# Patient Record
Sex: FEMALE | Race: WHITE | NOT HISPANIC OR LATINO | Employment: OTHER | URBAN - METROPOLITAN AREA
[De-identification: names, ages, dates, MRNs, and addresses within clinical notes are randomized per-mention and may not be internally consistent; named-entity substitution may affect disease eponyms.]

---

## 2023-10-16 ENCOUNTER — HOSPITAL ENCOUNTER (INPATIENT)
Facility: HOSPITAL | Age: 78
LOS: 2 days | Discharge: HOME | DRG: 322 | End: 2023-10-18
Attending: INTERNAL MEDICINE | Admitting: INTERNAL MEDICINE
Payer: MEDICARE

## 2023-10-16 DIAGNOSIS — I20.0 UNSTABLE ANGINA (MULTI): Primary | ICD-10-CM

## 2023-10-16 DIAGNOSIS — R93.1 ABNORMAL FINDINGS ON DIAGNOSTIC IMAGING OF HEART AND CORONARY CIRCULATION: ICD-10-CM

## 2023-10-16 DIAGNOSIS — I25.112 ATHEROSCLEROTIC HEART DISEASE OF NATIVE CORONARY ARTERY WITH REFRACTORY ANGINA PECTORIS (CMS-HCC): ICD-10-CM

## 2023-10-16 LAB
ANION GAP SERPL CALC-SCNC: 11 MMOL/L (ref 10–20)
APTT PPP: 28 SECONDS (ref 27–38)
BNP SERPL-MCNC: 344 PG/ML (ref 0–99)
BUN SERPL-MCNC: 21 MG/DL (ref 6–23)
CALCIUM SERPL-MCNC: 9 MG/DL (ref 8.6–10.3)
CARDIAC TROPONIN I PNL SERPL HS: 16 NG/L (ref 0–13)
CHLORIDE SERPL-SCNC: 103 MMOL/L (ref 98–107)
CO2 SERPL-SCNC: 31 MMOL/L (ref 21–32)
CREAT SERPL-MCNC: 0.52 MG/DL (ref 0.5–1.05)
ERYTHROCYTE [DISTWIDTH] IN BLOOD BY AUTOMATED COUNT: 12.3 % (ref 11.5–14.5)
GFR SERPL CREATININE-BSD FRML MDRD: >90 ML/MIN/1.73M*2
GLUCOSE SERPL-MCNC: 114 MG/DL (ref 74–99)
HCT VFR BLD AUTO: 44.4 % (ref 36–46)
HGB BLD-MCNC: 14.7 G/DL (ref 12–16)
INR PPP: 1.1 (ref 0.9–1.1)
MCH RBC QN AUTO: 30.5 PG (ref 26–34)
MCHC RBC AUTO-ENTMCNC: 33.1 G/DL (ref 32–36)
MCV RBC AUTO: 92 FL (ref 80–100)
NRBC BLD-RTO: 0 /100 WBCS (ref 0–0)
PLATELET # BLD AUTO: 225 X10*3/UL (ref 150–450)
PMV BLD AUTO: 10.1 FL (ref 7.5–11.5)
POTASSIUM SERPL-SCNC: 3.5 MMOL/L (ref 3.5–5.3)
PROTHROMBIN TIME: 12.4 SECONDS (ref 9.8–12.8)
RBC # BLD AUTO: 4.82 X10*6/UL (ref 4–5.2)
SODIUM SERPL-SCNC: 141 MMOL/L (ref 136–145)
WBC # BLD AUTO: 8.3 X10*3/UL (ref 4.4–11.3)

## 2023-10-16 PROCEDURE — 96372 THER/PROPH/DIAG INJ SC/IM: CPT | Performed by: INTERNAL MEDICINE

## 2023-10-16 PROCEDURE — 84484 ASSAY OF TROPONIN QUANT: CPT | Performed by: INTERNAL MEDICINE

## 2023-10-16 PROCEDURE — 36415 COLL VENOUS BLD VENIPUNCTURE: CPT | Performed by: INTERNAL MEDICINE

## 2023-10-16 PROCEDURE — 93005 ELECTROCARDIOGRAM TRACING: CPT

## 2023-10-16 PROCEDURE — B2111ZZ FLUOROSCOPY OF MULTIPLE CORONARY ARTERIES USING LOW OSMOLAR CONTRAST: ICD-10-PCS | Performed by: SPECIALIST

## 2023-10-16 PROCEDURE — 2500000001 HC RX 250 WO HCPCS SELF ADMINISTERED DRUGS (ALT 637 FOR MEDICARE OP): Performed by: INTERNAL MEDICINE

## 2023-10-16 PROCEDURE — 82374 ASSAY BLOOD CARBON DIOXIDE: CPT | Performed by: INTERNAL MEDICINE

## 2023-10-16 PROCEDURE — 4A023N7 MEASUREMENT OF CARDIAC SAMPLING AND PRESSURE, LEFT HEART, PERCUTANEOUS APPROACH: ICD-10-PCS | Performed by: SPECIALIST

## 2023-10-16 PROCEDURE — 1100000001 HC PRIVATE ROOM DAILY

## 2023-10-16 PROCEDURE — 85027 COMPLETE CBC AUTOMATED: CPT | Performed by: INTERNAL MEDICINE

## 2023-10-16 PROCEDURE — 85610 PROTHROMBIN TIME: CPT | Performed by: INTERNAL MEDICINE

## 2023-10-16 PROCEDURE — 2500000004 HC RX 250 GENERAL PHARMACY W/ HCPCS (ALT 636 FOR OP/ED): Performed by: INTERNAL MEDICINE

## 2023-10-16 PROCEDURE — 027034Z DILATION OF CORONARY ARTERY, ONE ARTERY WITH DRUG-ELUTING INTRALUMINAL DEVICE, PERCUTANEOUS APPROACH: ICD-10-PCS | Performed by: SPECIALIST

## 2023-10-16 PROCEDURE — 83880 ASSAY OF NATRIURETIC PEPTIDE: CPT | Performed by: INTERNAL MEDICINE

## 2023-10-16 RX ORDER — ROSUVASTATIN CALCIUM 20 MG/1
20 TABLET, COATED ORAL DAILY
COMMUNITY

## 2023-10-16 RX ORDER — ACETAMINOPHEN 325 MG/1
650 TABLET ORAL EVERY 4 HOURS PRN
Status: DISCONTINUED | OUTPATIENT
Start: 2023-10-16 | End: 2023-10-18 | Stop reason: HOSPADM

## 2023-10-16 RX ORDER — ROSUVASTATIN CALCIUM 20 MG/1
20 TABLET, COATED ORAL DAILY
Status: DISCONTINUED | OUTPATIENT
Start: 2023-10-17 | End: 2023-10-16

## 2023-10-16 RX ORDER — NITROGLYCERIN 0.4 MG/1
0.4 TABLET SUBLINGUAL EVERY 5 MIN PRN
Status: DISCONTINUED | OUTPATIENT
Start: 2023-10-16 | End: 2023-10-18 | Stop reason: HOSPADM

## 2023-10-16 RX ORDER — METOPROLOL SUCCINATE 100 MG/1
150 TABLET, EXTENDED RELEASE ORAL DAILY
COMMUNITY

## 2023-10-16 RX ORDER — ENOXAPARIN SODIUM 100 MG/ML
40 INJECTION SUBCUTANEOUS EVERY 24 HOURS
Status: DISCONTINUED | OUTPATIENT
Start: 2023-10-16 | End: 2023-10-17

## 2023-10-16 RX ORDER — TALC
3 POWDER (GRAM) TOPICAL NIGHTLY PRN
Status: DISCONTINUED | OUTPATIENT
Start: 2023-10-16 | End: 2023-10-18 | Stop reason: HOSPADM

## 2023-10-16 RX ORDER — ASPIRIN 81 MG/1
81 TABLET ORAL DAILY
COMMUNITY

## 2023-10-16 RX ORDER — ROSUVASTATIN CALCIUM 20 MG/1
20 TABLET, COATED ORAL NIGHTLY
Status: DISCONTINUED | OUTPATIENT
Start: 2023-10-16 | End: 2023-10-17 | Stop reason: SDUPTHER

## 2023-10-16 RX ORDER — AMITRIPTYLINE HYDROCHLORIDE 10 MG/1
10 TABLET, FILM COATED ORAL NIGHTLY
COMMUNITY

## 2023-10-16 RX ORDER — POLYETHYLENE GLYCOL 3350 17 G/17G
17 POWDER, FOR SOLUTION ORAL DAILY PRN
Status: DISCONTINUED | OUTPATIENT
Start: 2023-10-16 | End: 2023-10-18 | Stop reason: HOSPADM

## 2023-10-16 RX ORDER — ASPIRIN 81 MG/1
81 TABLET ORAL DAILY
Status: DISCONTINUED | OUTPATIENT
Start: 2023-10-17 | End: 2023-10-18 | Stop reason: HOSPADM

## 2023-10-16 RX ORDER — ACETAMINOPHEN 160 MG/5ML
650 SOLUTION ORAL EVERY 4 HOURS PRN
Status: DISCONTINUED | OUTPATIENT
Start: 2023-10-16 | End: 2023-10-18 | Stop reason: HOSPADM

## 2023-10-16 RX ORDER — LEVOTHYROXINE SODIUM 25 UG/1
25 TABLET ORAL
COMMUNITY

## 2023-10-16 RX ORDER — LISINOPRIL 20 MG/1
20 TABLET ORAL DAILY
COMMUNITY

## 2023-10-16 RX ORDER — ACETAMINOPHEN 650 MG/1
650 SUPPOSITORY RECTAL EVERY 4 HOURS PRN
Status: DISCONTINUED | OUTPATIENT
Start: 2023-10-16 | End: 2023-10-18 | Stop reason: HOSPADM

## 2023-10-16 RX ORDER — LISINOPRIL 20 MG/1
20 TABLET ORAL DAILY
Status: DISCONTINUED | OUTPATIENT
Start: 2023-10-17 | End: 2023-10-18 | Stop reason: HOSPADM

## 2023-10-16 RX ORDER — LEVOTHYROXINE SODIUM 25 UG/1
25 TABLET ORAL
Status: DISCONTINUED | OUTPATIENT
Start: 2023-10-17 | End: 2023-10-18 | Stop reason: HOSPADM

## 2023-10-16 RX ORDER — HYDROCHLOROTHIAZIDE 25 MG/1
12.5 TABLET ORAL DAILY
COMMUNITY

## 2023-10-16 RX ORDER — NITROGLYCERIN 0.4 MG/1
0.4 TABLET SUBLINGUAL EVERY 5 MIN PRN
COMMUNITY

## 2023-10-16 RX ORDER — AMITRIPTYLINE HYDROCHLORIDE 10 MG/1
10 TABLET, FILM COATED ORAL NIGHTLY
Status: DISCONTINUED | OUTPATIENT
Start: 2023-10-16 | End: 2023-10-18 | Stop reason: HOSPADM

## 2023-10-16 RX ORDER — HYDROCHLOROTHIAZIDE 25 MG/1
12.5 TABLET ORAL DAILY
Status: DISCONTINUED | OUTPATIENT
Start: 2023-10-17 | End: 2023-10-18 | Stop reason: HOSPADM

## 2023-10-16 RX ADMIN — AMITRIPTYLINE HYDROCHLORIDE 10 MG: 10 TABLET, FILM COATED ORAL at 21:47

## 2023-10-16 RX ADMIN — ENOXAPARIN SODIUM 40 MG: 40 INJECTION SUBCUTANEOUS at 21:47

## 2023-10-16 RX ADMIN — Medication 3 MG: at 21:47

## 2023-10-16 SDOH — SOCIAL STABILITY: SOCIAL INSECURITY: WERE YOU ABLE TO COMPLETE ALL THE BEHAVIORAL HEALTH SCREENINGS?: YES

## 2023-10-16 SDOH — SOCIAL STABILITY: SOCIAL INSECURITY: HAS ANYONE EVER THREATENED TO HURT YOUR FAMILY OR YOUR PETS?: NO

## 2023-10-16 SDOH — SOCIAL STABILITY: SOCIAL INSECURITY: ARE THERE ANY APPARENT SIGNS OF INJURIES/BEHAVIORS THAT COULD BE RELATED TO ABUSE/NEGLECT?: NO

## 2023-10-16 SDOH — SOCIAL STABILITY: SOCIAL INSECURITY: DO YOU FEEL UNSAFE GOING BACK TO THE PLACE WHERE YOU ARE LIVING?: NO

## 2023-10-16 SDOH — SOCIAL STABILITY: SOCIAL INSECURITY: HAVE YOU HAD THOUGHTS OF HARMING ANYONE ELSE?: YES

## 2023-10-16 SDOH — SOCIAL STABILITY: SOCIAL INSECURITY: ARE YOU OR HAVE YOU BEEN THREATENED OR ABUSED PHYSICALLY, EMOTIONALLY, OR SEXUALLY BY ANYONE?: NO

## 2023-10-16 SDOH — SOCIAL STABILITY: SOCIAL INSECURITY: DO YOU FEEL ANYONE HAS EXPLOITED OR TAKEN ADVANTAGE OF YOU FINANCIALLY OR OF YOUR PERSONAL PROPERTY?: NO

## 2023-10-16 SDOH — SOCIAL STABILITY: SOCIAL INSECURITY: ABUSE: ADULT

## 2023-10-16 SDOH — SOCIAL STABILITY: SOCIAL INSECURITY: DOES ANYONE TRY TO KEEP YOU FROM HAVING/CONTACTING OTHER FRIENDS OR DOING THINGS OUTSIDE YOUR HOME?: NO

## 2023-10-16 ASSESSMENT — COGNITIVE AND FUNCTIONAL STATUS - GENERAL
DAILY ACTIVITIY SCORE: 24
MOBILITY SCORE: 24
PATIENT BASELINE BEDBOUND: NO

## 2023-10-16 ASSESSMENT — ACTIVITIES OF DAILY LIVING (ADL)
HEARING - LEFT EAR: FUNCTIONAL
HEARING - RIGHT EAR: FUNCTIONAL
LACK_OF_TRANSPORTATION: NO
TOILETING: INDEPENDENT
JUDGMENT_ADEQUATE_SAFELY_COMPLETE_DAILY_ACTIVITIES: YES
FEEDING YOURSELF: INDEPENDENT
DRESSING YOURSELF: INDEPENDENT
GROOMING: INDEPENDENT
PATIENT'S MEMORY ADEQUATE TO SAFELY COMPLETE DAILY ACTIVITIES?: YES
BATHING: INDEPENDENT
WALKS IN HOME: INDEPENDENT
ADEQUATE_TO_COMPLETE_ADL: YES

## 2023-10-16 ASSESSMENT — PAIN SCALES - GENERAL
PAINLEVEL_OUTOF10: 0 - NO PAIN
PAINLEVEL_OUTOF10: 0 - NO PAIN

## 2023-10-16 ASSESSMENT — LIFESTYLE VARIABLES
HOW OFTEN DO YOU HAVE A DRINK CONTAINING ALCOHOL: 2-3 TIMES A WEEK
AUDIT-C TOTAL SCORE: 3
SKIP TO QUESTIONS 9-10: 1
HOW MANY STANDARD DRINKS CONTAINING ALCOHOL DO YOU HAVE ON A TYPICAL DAY: 1 OR 2
AUDIT-C TOTAL SCORE: 3
HOW OFTEN DO YOU HAVE 6 OR MORE DRINKS ON ONE OCCASION: NEVER

## 2023-10-16 ASSESSMENT — PAIN - FUNCTIONAL ASSESSMENT
PAIN_FUNCTIONAL_ASSESSMENT: 0-10
PAIN_FUNCTIONAL_ASSESSMENT: 0-10

## 2023-10-16 ASSESSMENT — COLUMBIA-SUICIDE SEVERITY RATING SCALE - C-SSRS
2. HAVE YOU ACTUALLY HAD ANY THOUGHTS OF KILLING YOURSELF?: NO
6. HAVE YOU EVER DONE ANYTHING, STARTED TO DO ANYTHING, OR PREPARED TO DO ANYTHING TO END YOUR LIFE?: NO
1. IN THE PAST MONTH, HAVE YOU WISHED YOU WERE DEAD OR WISHED YOU COULD GO TO SLEEP AND NOT WAKE UP?: NO

## 2023-10-16 ASSESSMENT — PATIENT HEALTH QUESTIONNAIRE - PHQ9
2. FEELING DOWN, DEPRESSED OR HOPELESS: NOT AT ALL
1. LITTLE INTEREST OR PLEASURE IN DOING THINGS: NOT AT ALL
SUM OF ALL RESPONSES TO PHQ9 QUESTIONS 1 & 2: 0

## 2023-10-16 NOTE — Clinical Note
Vessel: LAD (proximal). Stent inserted. Inflation 1: Pressure = 12 brandy; Duration = 35 sec. Stent deployed

## 2023-10-16 NOTE — Clinical Note
Angioplasty of the proximal left anterior descending lesion. Inflation 1: Pressure = 8 brandy; Duration = 8 sec. Inflation 2: Pressure = 8 brandy; Duration = 16 sec.

## 2023-10-16 NOTE — Clinical Note
Angioplasty of the left anterior descending lesion. Inflation 1: Pressure = 12 brandy; Duration = 20 sec. Inflation 2: Pressure = 16 brandy; Duration = 20 sec. Inflation 3: Pressure = 19 brandy; Duration = 30 sec.

## 2023-10-17 ENCOUNTER — APPOINTMENT (OUTPATIENT)
Dept: CARDIOLOGY | Facility: HOSPITAL | Age: 78
DRG: 322 | End: 2023-10-17
Payer: MEDICARE

## 2023-10-17 LAB
CARDIAC TROPONIN I PNL SERPL HS: 27 NG/L (ref 0–13)
CHOLEST SERPL-MCNC: 110 MG/DL (ref 0–199)
CHOLESTEROL/HDL RATIO: 2.3
EJECTION FRACTION APICAL 4 CHAMBER: 50
HDLC SERPL-MCNC: 48.4 MG/DL
LDLC SERPL CALC-MCNC: 49 MG/DL
MAGNESIUM SERPL-MCNC: 1.92 MG/DL (ref 1.6–2.4)
NON HDL CHOLESTEROL: 62 MG/DL (ref 0–149)
TRIGL SERPL-MCNC: 63 MG/DL (ref 0–149)
TSH SERPL-ACNC: 1.72 MIU/L (ref 0.44–3.98)
VLDL: 13 MG/DL (ref 0–40)

## 2023-10-17 PROCEDURE — 36415 COLL VENOUS BLD VENIPUNCTURE: CPT | Performed by: NURSE PRACTITIONER

## 2023-10-17 PROCEDURE — 2500000005 HC RX 250 GENERAL PHARMACY W/O HCPCS: Performed by: SPECIALIST

## 2023-10-17 PROCEDURE — 85347 COAGULATION TIME ACTIVATED: CPT | Performed by: SPECIALIST

## 2023-10-17 PROCEDURE — C1769 GUIDE WIRE: HCPCS | Performed by: SPECIALIST

## 2023-10-17 PROCEDURE — 96373 THER/PROPH/DIAG INJ IA: CPT | Performed by: SPECIALIST

## 2023-10-17 PROCEDURE — C1894 INTRO/SHEATH, NON-LASER: HCPCS | Performed by: SPECIALIST

## 2023-10-17 PROCEDURE — 93005 ELECTROCARDIOGRAM TRACING: CPT

## 2023-10-17 PROCEDURE — 99152 MOD SED SAME PHYS/QHP 5/>YRS: CPT | Performed by: SPECIALIST

## 2023-10-17 PROCEDURE — 83735 ASSAY OF MAGNESIUM: CPT | Performed by: NURSE PRACTITIONER

## 2023-10-17 PROCEDURE — C9600 PERC DRUG-EL COR STENT SING: HCPCS | Performed by: SPECIALIST

## 2023-10-17 PROCEDURE — 2780000003 HC OR 278 NO HCPCS: Performed by: SPECIALIST

## 2023-10-17 PROCEDURE — 2500000004 HC RX 250 GENERAL PHARMACY W/ HCPCS (ALT 636 FOR OP/ED): Performed by: NURSE PRACTITIONER

## 2023-10-17 PROCEDURE — 80061 LIPID PANEL: CPT | Performed by: INTERNAL MEDICINE

## 2023-10-17 PROCEDURE — 2500000004 HC RX 250 GENERAL PHARMACY W/ HCPCS (ALT 636 FOR OP/ED): Performed by: SPECIALIST

## 2023-10-17 PROCEDURE — 36415 COLL VENOUS BLD VENIPUNCTURE: CPT | Performed by: INTERNAL MEDICINE

## 2023-10-17 PROCEDURE — 93458 L HRT ARTERY/VENTRICLE ANGIO: CPT | Performed by: SPECIALIST

## 2023-10-17 PROCEDURE — 2500000001 HC RX 250 WO HCPCS SELF ADMINISTERED DRUGS (ALT 637 FOR MEDICARE OP): Performed by: SPECIALIST

## 2023-10-17 PROCEDURE — 2500000004 HC RX 250 GENERAL PHARMACY W/ HCPCS (ALT 636 FOR OP/ED): Performed by: INTERNAL MEDICINE

## 2023-10-17 PROCEDURE — 2500000001 HC RX 250 WO HCPCS SELF ADMINISTERED DRUGS (ALT 637 FOR MEDICARE OP): Performed by: INTERNAL MEDICINE

## 2023-10-17 PROCEDURE — C1874 STENT, COATED/COV W/DEL SYS: HCPCS | Performed by: SPECIALIST

## 2023-10-17 PROCEDURE — 84484 ASSAY OF TROPONIN QUANT: CPT | Performed by: INTERNAL MEDICINE

## 2023-10-17 PROCEDURE — 99153 MOD SED SAME PHYS/QHP EA: CPT | Performed by: SPECIALIST

## 2023-10-17 PROCEDURE — 2720000007 HC OR 272 NO HCPCS: Performed by: SPECIALIST

## 2023-10-17 PROCEDURE — C1887 CATHETER, GUIDING: HCPCS | Performed by: SPECIALIST

## 2023-10-17 PROCEDURE — 84443 ASSAY THYROID STIM HORMONE: CPT | Performed by: INTERNAL MEDICINE

## 2023-10-17 PROCEDURE — 2060000001 HC INTERMEDIATE ICU ROOM DAILY

## 2023-10-17 PROCEDURE — C1725 CATH, TRANSLUMIN NON-LASER: HCPCS | Performed by: SPECIALIST

## 2023-10-17 PROCEDURE — 93306 TTE W/DOPPLER COMPLETE: CPT

## 2023-10-17 DEVICE — STENT ONYXNG30034UX ONYX 3.00X34RX
Type: IMPLANTABLE DEVICE | Status: FUNCTIONAL
Brand: ONYX FRONTIER™

## 2023-10-17 RX ORDER — HYDRALAZINE HYDROCHLORIDE 20 MG/ML
5 INJECTION INTRAMUSCULAR; INTRAVENOUS EVERY 6 HOURS PRN
Status: DISCONTINUED | OUTPATIENT
Start: 2023-10-17 | End: 2023-10-18 | Stop reason: HOSPADM

## 2023-10-17 RX ORDER — ATORVASTATIN CALCIUM 80 MG/1
80 TABLET, FILM COATED ORAL NIGHTLY
Status: DISCONTINUED | OUTPATIENT
Start: 2023-10-17 | End: 2023-10-18 | Stop reason: HOSPADM

## 2023-10-17 RX ORDER — VERAPAMIL HYDROCHLORIDE 2.5 MG/ML
INJECTION, SOLUTION INTRAVENOUS AS NEEDED
Status: DISCONTINUED | OUTPATIENT
Start: 2023-10-17 | End: 2023-10-17 | Stop reason: HOSPADM

## 2023-10-17 RX ORDER — SODIUM CHLORIDE 9 MG/ML
INJECTION, SOLUTION INTRAVENOUS CONTINUOUS PRN
Status: DISCONTINUED | OUTPATIENT
Start: 2023-10-17 | End: 2023-10-17 | Stop reason: HOSPADM

## 2023-10-17 RX ORDER — FENTANYL CITRATE 50 UG/ML
INJECTION, SOLUTION INTRAMUSCULAR; INTRAVENOUS AS NEEDED
Status: DISCONTINUED | OUTPATIENT
Start: 2023-10-17 | End: 2023-10-17 | Stop reason: HOSPADM

## 2023-10-17 RX ORDER — LIDOCAINE HYDROCHLORIDE 20 MG/ML
INJECTION, SOLUTION INFILTRATION; PERINEURAL AS NEEDED
Status: DISCONTINUED | OUTPATIENT
Start: 2023-10-17 | End: 2023-10-17 | Stop reason: HOSPADM

## 2023-10-17 RX ORDER — MIDAZOLAM HYDROCHLORIDE 1 MG/ML
INJECTION INTRAMUSCULAR; INTRAVENOUS AS NEEDED
Status: DISCONTINUED | OUTPATIENT
Start: 2023-10-17 | End: 2023-10-17 | Stop reason: HOSPADM

## 2023-10-17 RX ORDER — NITROGLYCERIN 5 MG/ML
INJECTION, SOLUTION INTRAVENOUS AS NEEDED
Status: DISCONTINUED | OUTPATIENT
Start: 2023-10-17 | End: 2023-10-17 | Stop reason: HOSPADM

## 2023-10-17 RX ORDER — HEPARIN SODIUM 1000 [USP'U]/ML
INJECTION, SOLUTION INTRAVENOUS; SUBCUTANEOUS AS NEEDED
Status: DISCONTINUED | OUTPATIENT
Start: 2023-10-17 | End: 2023-10-17 | Stop reason: HOSPADM

## 2023-10-17 RX ORDER — SODIUM CHLORIDE 9 MG/ML
3 INJECTION, SOLUTION INTRAVENOUS CONTINUOUS
Status: ACTIVE | OUTPATIENT
Start: 2023-10-17 | End: 2023-10-17

## 2023-10-17 RX ORDER — LANOLIN ALCOHOL/MO/W.PET/CERES
400 CREAM (GRAM) TOPICAL ONCE
Status: COMPLETED | OUTPATIENT
Start: 2023-10-17 | End: 2023-10-17

## 2023-10-17 RX ORDER — POTASSIUM CHLORIDE 20 MEQ/1
40 TABLET, EXTENDED RELEASE ORAL ONCE
Status: COMPLETED | OUTPATIENT
Start: 2023-10-17 | End: 2023-10-17

## 2023-10-17 RX ADMIN — HYDRALAZINE HYDROCHLORIDE 5 MG: 20 INJECTION INTRAMUSCULAR; INTRAVENOUS at 10:14

## 2023-10-17 RX ADMIN — Medication 400 MG: at 10:14

## 2023-10-17 RX ADMIN — ATORVASTATIN CALCIUM 80 MG: 80 TABLET, FILM COATED ORAL at 21:38

## 2023-10-17 RX ADMIN — LISINOPRIL 20 MG: 20 TABLET ORAL at 08:11

## 2023-10-17 RX ADMIN — SODIUM CHLORIDE 3 ML/KG/HR: 9 INJECTION, SOLUTION INTRAVENOUS at 17:45

## 2023-10-17 RX ADMIN — POTASSIUM CHLORIDE 40 MEQ: 1500 TABLET, EXTENDED RELEASE ORAL at 09:34

## 2023-10-17 RX ADMIN — NITROGLYCERIN 0.4 MG: 0.4 TABLET SUBLINGUAL at 01:24

## 2023-10-17 RX ADMIN — TICAGRELOR 90 MG: 90 TABLET ORAL at 21:38

## 2023-10-17 RX ADMIN — ASPIRIN 81 MG: 81 TABLET, COATED ORAL at 08:11

## 2023-10-17 RX ADMIN — Medication 3 MG: at 22:28

## 2023-10-17 RX ADMIN — LEVOTHYROXINE SODIUM 25 MCG: 25 TABLET ORAL at 06:14

## 2023-10-17 RX ADMIN — AMITRIPTYLINE HYDROCHLORIDE 10 MG: 10 TABLET, FILM COATED ORAL at 21:38

## 2023-10-17 RX ADMIN — HYDROCHLOROTHIAZIDE 12.5 MG: 25 TABLET ORAL at 08:11

## 2023-10-17 RX ADMIN — METOPROLOL SUCCINATE 150 MG: 100 TABLET, EXTENDED RELEASE ORAL at 08:11

## 2023-10-17 ASSESSMENT — PAIN SCALES - GENERAL
PAINLEVEL_OUTOF10: 0 - NO PAIN
PAINLEVEL_OUTOF10: 4
PAINLEVEL_OUTOF10: 0 - NO PAIN

## 2023-10-17 NOTE — H&P
DELTA NEWSOME MD  Cardiovascular Disease  [facility]  [address] [facility]   [address], [address]  [place] [address]  ____________________________________________________________________________________    HISTORY AND PHYSICAL     SERVICE DATE: 10/16/2023   SERVICE TIME:  8:42 PM    PRIMARY CARE PHYSICIAN:  Marry Mancini MD    Subjective   CHIEF COMPLAINT:   Chest pain    HISTORY OF PRESENT ILLNESS:  Chelsea Stinson is a 78 y.o. female with a PMH significant for CAD s/p PCI in 2013, HTN, HLD, hypothyroidism, nephrolithiasis, anxiety who presents for direct admission for management of unstable angina. She has had progressively more frequent chest pain, initially at times with exertion, now with only mild exertion, starting over a month ago. Pain is now present with any stress and with preparing her  for bed, which includes changing his indwelling catheter. She does not help to maneuver him. She does have some shortness of breath with the chest pain. She may have some palpitations when anxious. She denies lightheadedness, dizziness, leg swelling, orthopnea, PND.     Patient recently underwent stress SPECT on 9/18 to evaluate her symptoms which showed ischemia in the LAD territory.     Past Medical History:   Diagnosis Date    Other conditions influencing health status     Coronary Artery Disease     Past Surgical History:   Procedure Laterality Date    CORONARY ANGIOPLASTY WITH STENT PLACEMENT  08/22/2013    Cath Stent Placement    LITHOTRIPSY  07/27/2015    Renal Lithotripsy    TUBAL LIGATION  07/27/2015    Tubal Ligation     No family history on file.    Social History     Socioeconomic History    Marital status:      Spouse name: Not on file    Number of children: Not on file    Years of education: Not on file    Highest education level: Not on file   Occupational History    Not on file   Tobacco Use    Smoking status: Not on file    Smokeless tobacco: Not on file    Substance and Sexual Activity    Alcohol use: Not on file    Drug use: Not on file    Sexual activity: Not on file   Other Topics Concern    Not on file   Social History Narrative    Not on file     Social Determinants of Health     Financial Resource Strain: Low Risk  (10/16/2023)    Overall Financial Resource Strain (CARDIA)     Difficulty of Paying Living Expenses: Not hard at all   Food Insecurity: Not on file   Transportation Needs: No Transportation Needs (10/16/2023)    PRAPARE - Transportation     Lack of Transportation (Medical): No     Lack of Transportation (Non-Medical): No   Physical Activity: Not on file   Stress: Not on file   Social Connections: Not on file   Intimate Partner Violence: Not on file   Housing Stability: Low Risk  (10/16/2023)    Housing Stability Vital Sign     Unable to Pay for Housing in the Last Year: No     Number of Places Lived in the Last Year: 1     Unstable Housing in the Last Year: No         MEDICATIONS:  Medications Prior to Admission   Medication Sig Dispense Refill Last Dose    amitriptyline (Elavil) 10 mg tablet Take 1 tablet (10 mg) by mouth once daily at bedtime.   10/15/2023    aspirin 81 mg EC tablet Take 1 tablet (81 mg) by mouth once daily.   10/16/2023    hydroCHLOROthiazide (HYDRODiuril) 25 mg tablet Take 0.5 tablets (12.5 mg) by mouth once daily.   10/16/2023    levothyroxine (Synthroid, Levoxyl) 25 mcg tablet Take 1 tablet (25 mcg) by mouth once daily in the morning. Take before meals.   10/16/2023    lisinopril 20 mg tablet Take 1 tablet (20 mg) by mouth once daily.   10/16/2023    metoprolol succinate XL (Toprol-XL) 100 mg 24 hr tablet Take 1.5 tablets (150 mg) by mouth once daily. Do not crush or chew.   10/16/2023    nitroglycerin (Nitrostat) 0.4 mg SL tablet Place 1 tablet (0.4 mg) under the tongue every 5 minutes if needed for chest pain.   Past Month    rosuvastatin (Crestor) 20 mg tablet Take 1 tablet (20 mg) by mouth once daily.   10/16/2023     No  "Known Allergies    COMPLETE REVIEW OF SYSTEMS:  All systems reviewed and are negative except for as stated in the HPI.       Objective   PHYSICAL EXAM:  Patient Vitals for the past 24 hrs:   BP Temp Temp src Pulse Resp SpO2 Height Weight   10/16/23 1945 159/85 36.1 °C (97 °F) Temporal 60 18 100 % -- --   10/16/23 1826 172/86 -- -- 67 20 -- -- --   10/16/23 1824 (!) 200/86 35.6 °C (96.1 °F) Temporal 72 20 98 % 1.676 m (5' 6\") 62.3 kg (137 lb 5.6 oz)     Body mass index is 22.17 kg/m².  General appearance: Well appearing, alert, in no acute distress  Skin: Skin color, texture, turgor normal  Head: Normocephalic, no masses, lesions  Eyes: Anicteric sclera, conjunctiva clear, EOMI  Oropharynx: Moist mucus membranes, good dentition  Neck: Supple, no adenopathy; thyroid symmetric  Lungs: Patient airways, CTAB, no wheezes, rhonchi, or rales   Heart: Regular rate and rhythm; no murmur, gallop, or rub; normal S1 and S2, no JVD  Abdomen: Soft, nontender, nondistended, no obvious HSM  Extremities: No edema, no cyanosis, no clubbing  Vascular: Pedal pulses 2+ b/l, no carotid bruits  Psych: A&Ox3, appropriate mood and affect     DATA:   Diagnostic tests reviewed for today's visit:      STRESS SPECT 9/18/2023  CONCLUSION:   1. This is an intermediate risk study.   2. There is no electrocardiographic evidence for stress-induced ischemia.   3. There is a small to medium-sized, moderate severity, mid to distal anterior, anteroseptal, and apical reversible perfusion defect suggestive of ischemia. There is transient ischemic dilation seen.   4. Normal left ventricular systolic function with calculated ejection fraction of 67% by gated SPECT with no regional wall motion abnormalities appreciated.          ASSESSMENT:     Unstable angina, progressive symptoms now with mild exertion. Stress SPECT 9/2023 showed anterior and apical ischemia, TID 1.27.   CAD s/p PCI to LCX/OM in 2013  Essential hypertension, elevated today in the setting of " anxiety   Dyslipidemia  Hypothyroidism  Anxiety      PLAN:     - NPO after midnight for LHC +/- PCI with Dr. Sprague in AM   - ASA 81mg daily, high intensity statin   - Continue metoprolol, recently increased to 150mg daily  - Continue lisinopril, hydrochlorothiazide  - SL NTG PRN for chest pain  - Continuous telemetry monitoring   - Transthoracic echocardiogram  - ECG  - CBC, BMP, proBNP, hsTNT  - Check lipid panel, TSH in AM  - Continue home Elavil at bedtime, PRN melatonin   - Continue levothyroxine            I spent 45 minutes in the visit, with more than 50% of the total face-to-face time of the visit in counseling / coordination of care.    DELTA NEWSOME MD, [facility]  Cardiovascular Medicine  [facility]  Phone/Answering Service: [phone]  Fax: [phone]

## 2023-10-17 NOTE — NURSING NOTE
Patient is alert x4. Went for cath lab this evening around 4pm. Returned with TR band R radial with 16ml of air. No bruising and no hematoma at site. Denies chest pain, denies SOB. NSR on tele. Continues with plan of care.

## 2023-10-17 NOTE — PROGRESS NOTES
DELTA NEWSOME MD  Cardiovascular Disease  [facility]  [address] [facility]   [address], [address]  [place] [address]  __________________________________________________________________    CARDIOLOGY SERVICE PROGRESS NOTE     SERVICE DATE: 10/17/2023        SERVICE TIME: 6:30 PM                  Subjective   INTERIM HISTORY: Had resting chest pain overnight that resolved with 1 SL NTG. Didn't sleep the rest of the night.     Had cath this afternoon, PCI to LAD via radial access. Doing well so far. Denies chest pain currently. Denies shortness of breath, palpitations, lightheadedness, fever, chills.                                             Objective   PHYSICAL EXAM:   Body mass index is 22.24 kg/m².   O2 Therapy: Room Air   No data recorded    Vitals:    10/17/23 1757 10/17/23 1812 10/17/23 1833 10/17/23 1848   BP: 133/74 131/67 115/74 135/89   BP Location: Left arm Left arm Left arm Left arm   Patient Position: Lying Lying Lying Lying   Pulse: 64 60 66 72   Resp: 16 16 18 16   Temp: 36.5 °C (97.7 °F) 36.5 °C (97.7 °F) 36.5 °C (97.7 °F) 36.7 °C (98.1 °F)   TempSrc: Temporal Temporal Temporal Temporal   SpO2: 94% 95% 93% 94%   Weight:       Height:            Intake/Output Summary (Last 24 hours) at 10/17/2023 1957  Last data filed at 10/17/2023 1833  Gross per 24 hour   Intake 900 ml   Output 1205 ml   Net -305 ml         PHYSICAL EXAMINATION:  General appearance: Well appearing, alert, in no acute distress  Skin: Skin color, texture, turgor normal  Head: Normocephalic, no masses, lesions  Eyes: Anicteric sclera, conjunctiva clear, EOMI  Oropharynx: Moist mucus membranes, good dentition  Neck: Supple, no adenopathy; thyroid symmetric  Lungs: Patient airways, CTAB, no wheezes, rhonchi, or rales   Heart: Regular rate and rhythm; no murmur, gallop, or rub; normal S1 and S2, no JVD  Abdomen: Soft, nontender, nondistended, no obvious HSM  Extremities: No edema, no cyanosis, no  clubbing  Vascular: Pedal pulses 2+ b/l, no carotid bruits  Psych: A&Ox3, appropriate mood and affect       MEDICATIONS:    Scheduled medications  amitriptyline, 10 mg, oral, Nightly  aspirin, 81 mg, oral, Daily  atorvastatin, 80 mg, oral, Nightly  enoxaparin, 40 mg, subcutaneous, q24h  hydroCHLOROthiazide, 12.5 mg, oral, Daily  levothyroxine, 25 mcg, oral, Daily before breakfast  lisinopril, 20 mg, oral, Daily  metoprolol succinate XL, 150 mg, oral, Daily  perflutren lipid microspheres, 0.5-10 mL of dilution, intravenous, Once in imaging      Continuous medications  sodium chloride 0.9%, 3 mL/kg/hr, Last Rate: 3 mL/kg/hr (10/17/23 1745)      PRN medications  PRN medications: acetaminophen **OR** acetaminophen **OR** acetaminophen, hydrALAZINE, melatonin, nitroglycerin, oxygen, polyethylene glycol      Labs:   Latest Reference Range & Units 10/16/23 21:10 10/17/23 04:49 10/17/23 09:17   GLUCOSE 74 - 99 mg/dL 114 (H)     SODIUM 136 - 145 mmol/L 141     POTASSIUM 3.5 - 5.3 mmol/L 3.5     CHLORIDE 98 - 107 mmol/L 103     Bicarbonate 21 - 32 mmol/L 31     Anion Gap 10 - 20 mmol/L 11     Blood Urea Nitrogen 6 - 23 mg/dL 21     Creatinine 0.50 - 1.05 mg/dL 0.52     EGFR >60 mL/min/1.73m*2 >90     Calcium 8.6 - 10.3 mg/dL 9.0     HDL CHOLESTEROL mg/dL  48.4    Cholesterol/HDL Ratio   2.3    LDL Calculated <=99 mg/dL  49    VLDL 0 - 40 mg/dL  13    TRIGLYCERIDES 0 - 149 mg/dL  63    Non HDL Cholesterol 0 - 149 mg/dL  62    MAGNESIUM 1.60 - 2.40 mg/dL   1.92   CHOLESTEROL 0 - 199 mg/dL  110    BNP 0 - 99 pg/mL 344 (H)     Troponin I, High Sensitivity 0 - 13 ng/L 16 (H) 27 (H)    Thyroid Stimulating Hormone 0.44 - 3.98 mIU/L  1.72    INR 0.9 - 1.1  1.1     Protime 9.8 - 12.8 seconds 12.4     aPTT 27 - 38 seconds 28     WBC 4.4 - 11.3 x10*3/uL 8.3     nRBC 0.0 - 0.0 /100 WBCs 0.0     RBC 4.00 - 5.20 x10*6/uL 4.82     HEMOGLOBIN 12.0 - 16.0 g/dL 14.7     HEMATOCRIT 36.0 - 46.0 % 44.4     MCV 80 - 100 fL 92     MCH 26.0 - 34.0  pg 30.5     MCHC 32.0 - 36.0 g/dL 33.1     RED CELL DISTRIBUTION WIDTH 11.5 - 14.5 % 12.3     Platelets 150 - 450 x10*3/uL 225     MEAN PLATELET VOLUME 7.5 - 11.5 fL 10.1     (H): Data is abnormally high           Assessment       ASSESSMENT:      Unstable angina, s/p PCI to LAD with JULIANN today.   CAD s/p PCI to LCX/OM in 2013  Essential hypertension, elevated today in the setting of anxiety   Dyslipidemia  Hypothyroidism  Anxiety        PLAN:      - DAPT with ASA 81mg daily and Brilinta 90mg BID for 1 year  - Continue rosuvastatin; LDL at goal   - Continue metoprolol, recently increased to 150mg daily  - Continue lisinopril, hydrochlorothiazide at current doses  - SL NTG PRN for chest pain  - Continuous telemetry monitoring   - Continue home Elavil at bedtime, PRN melatonin   - Continue levothyroxine   - Referral placed to cardiac rehab         Dispo: Anticipated discharge home tomorrow, no skilled needs.     DELTA NEWSOME MD, [facility]  Cardiovascular Medicine  [facility]  Phone/Answering Service: [phone]  Fax: [phone]

## 2023-10-17 NOTE — CARE PLAN
The clinical goals for the shift include patient will not complain of chest pain throughout shift. The patient did not meet this goal.    S: Patient admitted 10/16 for chest pain  B: History of: HTN, HLD, hypothyroid, anxiety, stent in 2013  A: Patient A&Ox4, did complain of CP once this shift, medicated per orders with one Nitroglycerin tablet which resolved pain. Remains NSR/sinus neda and on RA. NPO after midnight for heart cath today  R: Patient to have heart cath today

## 2023-10-18 VITALS
HEART RATE: 78 BPM | SYSTOLIC BLOOD PRESSURE: 154 MMHG | WEIGHT: 136.69 LBS | TEMPERATURE: 97.7 F | HEIGHT: 66 IN | DIASTOLIC BLOOD PRESSURE: 72 MMHG | RESPIRATION RATE: 18 BRPM | OXYGEN SATURATION: 97 % | BODY MASS INDEX: 21.97 KG/M2

## 2023-10-18 PROBLEM — I10 ESSENTIAL HYPERTENSION: Status: ACTIVE | Noted: 2023-10-18

## 2023-10-18 PROBLEM — I25.110 CORONARY ARTERY DISEASE INVOLVING NATIVE CORONARY ARTERY OF NATIVE HEART WITH UNSTABLE ANGINA PECTORIS (MULTI): Status: ACTIVE | Noted: 2023-10-18

## 2023-10-18 PROBLEM — E03.8 OTHER SPECIFIED HYPOTHYROIDISM: Status: ACTIVE | Noted: 2023-10-18

## 2023-10-18 PROBLEM — E78.00 PURE HYPERCHOLESTEROLEMIA: Status: ACTIVE | Noted: 2023-10-18

## 2023-10-18 PROBLEM — I20.0 UNSTABLE ANGINA (MULTI): Status: RESOLVED | Noted: 2023-10-16 | Resolved: 2023-10-18

## 2023-10-18 LAB
ANION GAP SERPL CALC-SCNC: 11 MMOL/L (ref 10–20)
BUN SERPL-MCNC: 14 MG/DL (ref 6–23)
CALCIUM SERPL-MCNC: 8.8 MG/DL (ref 8.6–10.3)
CHLORIDE SERPL-SCNC: 105 MMOL/L (ref 98–107)
CO2 SERPL-SCNC: 27 MMOL/L (ref 21–32)
CREAT SERPL-MCNC: 0.49 MG/DL (ref 0.5–1.05)
GFR SERPL CREATININE-BSD FRML MDRD: >90 ML/MIN/1.73M*2
GLUCOSE SERPL-MCNC: 153 MG/DL (ref 74–99)
MAGNESIUM SERPL-MCNC: 1.97 MG/DL (ref 1.6–2.4)
POTASSIUM SERPL-SCNC: 3.8 MMOL/L (ref 3.5–5.3)
SODIUM SERPL-SCNC: 139 MMOL/L (ref 136–145)

## 2023-10-18 PROCEDURE — 80048 BASIC METABOLIC PNL TOTAL CA: CPT | Performed by: NURSE PRACTITIONER

## 2023-10-18 PROCEDURE — 2500000004 HC RX 250 GENERAL PHARMACY W/ HCPCS (ALT 636 FOR OP/ED): Performed by: INTERNAL MEDICINE

## 2023-10-18 PROCEDURE — 83735 ASSAY OF MAGNESIUM: CPT | Performed by: NURSE PRACTITIONER

## 2023-10-18 PROCEDURE — 2500000001 HC RX 250 WO HCPCS SELF ADMINISTERED DRUGS (ALT 637 FOR MEDICARE OP): Performed by: INTERNAL MEDICINE

## 2023-10-18 PROCEDURE — 37799 UNLISTED PX VASCULAR SURGERY: CPT | Performed by: NURSE PRACTITIONER

## 2023-10-18 PROCEDURE — 2500000004 HC RX 250 GENERAL PHARMACY W/ HCPCS (ALT 636 FOR OP/ED): Performed by: NURSE PRACTITIONER

## 2023-10-18 RX ORDER — POTASSIUM CHLORIDE 20 MEQ/1
20 TABLET, EXTENDED RELEASE ORAL ONCE
Status: COMPLETED | OUTPATIENT
Start: 2023-10-18 | End: 2023-10-18

## 2023-10-18 RX ADMIN — POTASSIUM CHLORIDE 20 MEQ: 1500 TABLET, EXTENDED RELEASE ORAL at 12:12

## 2023-10-18 RX ADMIN — HYDRALAZINE HYDROCHLORIDE 5 MG: 20 INJECTION INTRAMUSCULAR; INTRAVENOUS at 12:16

## 2023-10-18 RX ADMIN — HYDROCHLOROTHIAZIDE 12.5 MG: 25 TABLET ORAL at 08:01

## 2023-10-18 RX ADMIN — ASPIRIN 81 MG: 81 TABLET, COATED ORAL at 08:01

## 2023-10-18 RX ADMIN — LEVOTHYROXINE SODIUM 25 MCG: 25 TABLET ORAL at 06:41

## 2023-10-18 RX ADMIN — METOPROLOL SUCCINATE 150 MG: 100 TABLET, EXTENDED RELEASE ORAL at 08:01

## 2023-10-18 RX ADMIN — LISINOPRIL 20 MG: 20 TABLET ORAL at 08:02

## 2023-10-18 RX ADMIN — TICAGRELOR 90 MG: 90 TABLET ORAL at 08:01

## 2023-10-18 RX ADMIN — SODIUM CHLORIDE 500 ML: 9 INJECTION, SOLUTION INTRAVENOUS at 11:08

## 2023-10-18 ASSESSMENT — PAIN SCALES - GENERAL
PAINLEVEL_OUTOF10: 0 - NO PAIN

## 2023-10-18 ASSESSMENT — PAIN - FUNCTIONAL ASSESSMENT
PAIN_FUNCTIONAL_ASSESSMENT: 0-10

## 2023-10-18 NOTE — DISCHARGE INSTRUCTIONS
CARDIAC CATHETERIZATION DISCHARGE INSTRUCTIONS    Date of Catheterization: 10/17/2023    Care of the Incision:  Wash hands before touching incision or changing Band-Aid.  You many shower 24 hours after the procedure.  Gently clean the catheterization site using soap and water while standing in the shower.  Pat dry thoroughly.  Do not apply powders or lotions.  Cover the site with a Band-Aid to protect the area. Keep the site clean and dry to prevent infection.  If the Band-Aid becomes wet, remove it and replace with a dry Band-Aid.  Do not sit in a bathtub or pool of water for five days or until the wound has healed.  Inspect the site daily.    Activity:  You may resume normal activity in 24 hours, including driving, letting pain be your guide.  No heavy lifting (more than 5 lb) for one week or until the wound heals.  Limit climbing steps and avoid excessive bending, squatting, and stooping.    Normal Observations:  Soreness or tenderness may last one week.  You may have mild oozing or blood from the incision site.  There is a possibility of bruising at the site that may last up to a few weeks.    Seek Medical Attention Immediately if You Experience any of the Following:  A large quantity of pulsating blood from the puncture site. If this happens, you must apply direct pressure to stop the bleeding and call 911.  Call your doctor if you have increased swelling or develop loss of sensation, numbness, coolness, or tingling.  Call your doctor if you have unusual pain at the catheterization site.   Signs of infection: redness, warmth to the touch, drainage (other than blood), poorly healing incision, fever over 101 degrees Fahrenheit or chills.    Attention Diabetics:  ?If taking Glucophage (metformin) or any derivative of Glucophage (metformin), please hold this medication for 48 hours after your catheterization.    For any comments or concerns, please do not hesitate to call Dr Guevara at [phone].    Call   Ismael for a follow up appointment in 2 weeks.        Electronically SIGNED by Licensed Independent Practitioner: Aaron Guevara MD

## 2023-10-18 NOTE — DISCHARGE SUMMARY
"  DISCHARGE SUMMARY    PATIENT NAME: Chelsea Stinson ADMISSION DATE: 10/16/2023    MRN: [id number]  DISCHARGE DATE: 10/18/2023     ATTENDING PHYSICIAN: Aaron Guevara MD        Code Status: Full Code    CONSULTING TEAMS DURING HOSPITALIZATION:  Monica Sprague MD    REASON FOR HOSPITALIZATION: Unstable angina    DIAGNOSIS:  Patient Active Problem List   Diagnosis    Coronary artery disease involving native coronary artery of native heart with unstable angina pectoris (CMS/HCC)    Essential hypertension    Pure hypercholesterolemia    Other specified hypothyroidism       OPERATIONS DURING HOSPITALIZATION: None    PROCEDURES DURING HOSPITALIZATION: Left heart catheterization, PCI, Transthoracic echocardiogram    HOSPITAL COURSE:    78 y.o. female with a PMH significant for CAD s/p PCI in 2013, HTN, HLD, hypothyroidism, nephrolithiasis, anxiety who presented for direct admission for management of unstable angina. She had progressive worsening of chest pain that started a few months ago, significantly worsened over the last 1-2 weeks. Patient recently underwent stress SPECT on 9/18 to evaluate her symptoms which showed ischemia in the LAD territory. She underwent LHC and subsequent PCI to proximal LAD with JULIANN; previous stent was patent. Echocardiogram showed preserved LV systolic function but RWMA in the LAD territory were apparent. She was started on DAPT with Brilinta for 1 year. Home metoprolol and lisinopril were continued.       PATIENT CONDITION AT DISCHARGE: Improved    DISCHARGE DISPOSITION: Home       Discharge Physical Exam:  VITAL SIGNS: /84 (BP Location: Left arm, Patient Position: Lying)   Pulse 71   Temp 36.3 °C (97.3 °F) (Temporal)   Resp 16   Ht 1.676 m (5' 6\")   Wt 62 kg (136 lb 11 oz)   SpO2 94%   BMI 22.06 kg/m²      General appearance: Well appearing, alert, in no acute distress  Skin: Skin color, texture, turgor normal  Head: Normocephalic, no masses, lesions  Eyes: Anicteric sclera, " conjunctiva clear, EOMI  Oropharynx: Moist mucus membranes, good dentition  Neck: Supple, no adenopathy; thyroid symmetric  Lungs: Patient airways, CTAB, no wheezes, rhonchi, or rales   Heart: Regular rate and rhythm; no murmur, gallop, or rub; normal S1 and S2, no JVD  Abdomen: Soft, nontender, nondistended, no obvious HSM  Extremities: No edema, no cyanosis, no clubbing  Vascular: Pedal pulses 2+ b/l, no carotid bruits  Psych: A&Ox3, appropriate mood and affect       WOUND/SURGICAL SITE CARE:   Wash hands before touching incision or changing Band-Aid.  You many shower 24 hours after the procedure.  Gently clean the catheterization site using soap and water while standing in the shower.  Pat dry thoroughly.  Do not apply powders or lotions.  Cover the site with a Band-Aid to protect the area. Keep the site clean and dry to prevent infection.  If the Band-Aid becomes wet, remove it and replace with a dry Band-Aid.  Do not sit in a bathtub or pool of water for five days or until the wound has healed.  Inspect the site daily.      DIET: Low sodium, low fat, low cholesterol     ACTIVITY: May return to normal activity 24 hours after cardiac catheterization      DISCHARGE MEDICATION:     Your medication list        START taking these medications        Instructions Last Dose Given Next Dose Due   ticagrelor 90 mg tablet  Commonly known as: Brilinta      Take 1 tablet (90 mg) by mouth 2 times a day for 728 doses.              CONTINUE taking these medications        Instructions Last Dose Given Next Dose Due   amitriptyline 10 mg tablet  Commonly known as: Elavil           aspirin 81 mg EC tablet           hydroCHLOROthiazide 25 mg tablet  Commonly known as: HYDRODiuril           levothyroxine 25 mcg tablet  Commonly known as: Synthroid, Levoxyl           lisinopril 20 mg tablet           metoprolol succinate  mg 24 hr tablet  Commonly known as: Toprol-XL           nitroglycerin 0.4 mg SL tablet  Commonly known as:  Nitrostat           rosuvastatin 20 mg tablet  Commonly known as: Crestor                     Where to Get Your Medications        These medications were sent to [id number] - [place], [place] - [address] AT [address]  [address], [facility] [address]      Phone: [phone]   ticagrelor 90 mg tablet          FUTURE APPOINTMENTS:    Follow up with Dr. Newsome in 2 weeks.         I have performed the face-to-face and relevant services for a total of > 30 minutes.      DELTA NEWSOME MD, [facility]  Cardiovascular Medicine  [facility]  Phone/Answering Service: [phone]  Fax: [phone]

## 2023-10-18 NOTE — CARE PLAN
Pt A&O x4. No complaints of chest pain throughout the shift. Denies SOB. TR band with 16ml air removed, pressure dressing applied. No bleeding, no hematoma at site. NSR on tele. On RA. Medicated per orders. Safety maintained.

## 2023-10-19 ENCOUNTER — HOSPITAL ENCOUNTER (OUTPATIENT)
Dept: CARDIOLOGY | Facility: HOSPITAL | Age: 78
Discharge: HOME | End: 2023-10-19

## 2023-10-19 ENCOUNTER — PATIENT OUTREACH (OUTPATIENT)
Dept: CARE COORDINATION | Facility: CLINIC | Age: 78
End: 2023-10-19
Payer: MEDICARE

## 2023-10-19 LAB
ATRIAL RATE: 65 BPM
P AXIS: 63 DEGREES
P OFFSET: 187 MS
P ONSET: 140 MS
PR INTERVAL: 168 MS
Q ONSET: 224 MS
QRS COUNT: 11 BEATS
QRS DURATION: 94 MS
QT INTERVAL: 444 MS
QTC CALCULATION(BAZETT): 461 MS
QTC FREDERICIA: 455 MS
R AXIS: -22 DEGREES
T AXIS: 126 DEGREES
T OFFSET: 446 MS
VENTRICULAR RATE: 65 BPM

## 2023-10-19 PROCEDURE — 93010 ELECTROCARDIOGRAM REPORT: CPT | Performed by: INTERNAL MEDICINE

## 2023-10-19 NOTE — PROGRESS NOTES
Outreach call to patient to support a smooth transition of care from recent admission.  No answer.  Enrolled patient in [employer] chatbot for additional support and patient education through transition period.  Will continue to monitor through transition period.

## 2023-10-21 ENCOUNTER — HOSPITAL ENCOUNTER (OUTPATIENT)
Dept: CARDIOLOGY | Facility: HOSPITAL | Age: 78
Discharge: HOME | End: 2023-10-21
Payer: MEDICARE

## 2023-10-21 LAB
ATRIAL RATE: 57 BPM
P AXIS: -1 DEGREES
P OFFSET: 179 MS
P ONSET: 146 MS
PR INTERVAL: 154 MS
Q ONSET: 223 MS
QRS COUNT: 9 BEATS
QRS DURATION: 96 MS
QT INTERVAL: 444 MS
QTC CALCULATION(BAZETT): 432 MS
QTC FREDERICIA: 436 MS
R AXIS: -12 DEGREES
T AXIS: 140 DEGREES
T OFFSET: 445 MS
VENTRICULAR RATE: 57 BPM

## 2023-11-01 ENCOUNTER — TRANSCRIBE ORDERS (OUTPATIENT)
Dept: CARDIAC REHAB | Facility: CLINIC | Age: 78
End: 2023-11-01
Payer: MEDICARE

## 2023-11-01 DIAGNOSIS — Z95.5 STENTED CORONARY ARTERY: Primary | ICD-10-CM

## 2023-11-01 PROBLEM — R06.09 EXERTIONAL DYSPNEA: Status: ACTIVE | Noted: 2023-11-01

## 2023-11-01 PROBLEM — I25.10 CAD S/P PERCUTANEOUS CORONARY ANGIOPLASTY: Status: ACTIVE | Noted: 2023-11-01

## 2023-11-01 PROBLEM — I05.9 MITRAL VALVE DISORDER: Status: ACTIVE | Noted: 2023-11-01

## 2023-11-01 PROBLEM — J18.9 PNEUMONIA: Status: ACTIVE | Noted: 2020-10-29

## 2023-11-01 PROBLEM — N95.2 VAGINAL ATROPHY: Status: ACTIVE | Noted: 2023-11-01

## 2023-11-01 PROBLEM — N81.4 UTEROVAGINAL PROLAPSE: Status: ACTIVE | Noted: 2023-11-01

## 2023-11-01 PROBLEM — E78.5 DYSLIPIDEMIA: Status: ACTIVE | Noted: 2023-11-01

## 2023-11-01 PROBLEM — M16.10 PRIMARY LOCALIZED OSTEOARTHRITIS OF PELVIC REGION AND THIGH: Status: ACTIVE | Noted: 2023-04-27

## 2023-11-01 PROBLEM — R94.39 ABNORMAL NUCLEAR STRESS TEST: Status: ACTIVE | Noted: 2023-11-01

## 2023-11-01 PROBLEM — M81.0 OSTEOPOROSIS: Status: ACTIVE | Noted: 2023-11-01

## 2023-11-01 PROBLEM — Z98.61 CAD S/P PERCUTANEOUS CORONARY ANGIOPLASTY: Status: ACTIVE | Noted: 2023-11-01

## 2023-11-01 PROBLEM — R91.8 LUNG INFILTRATE ON CT: Status: ACTIVE | Noted: 2020-10-28

## 2023-11-01 RX ORDER — FLUTICASONE PROPIONATE 50 MCG
1 SPRAY, SUSPENSION (ML) NASAL 2 TIMES DAILY PRN
COMMUNITY
Start: 2017-11-04

## 2023-11-01 RX ORDER — BACLOFEN 10 MG/1
10 TABLET ORAL 2 TIMES DAILY
COMMUNITY
Start: 2023-05-13

## 2023-11-01 RX ORDER — MAGNESIUM SULFATE 100 MG
1 CAPSULE ORAL DAILY
COMMUNITY

## 2023-11-01 RX ORDER — MULTIVIT-MIN/FA/LYCOPEN/LUTEIN .4-300-25
1 TABLET ORAL DAILY
COMMUNITY

## 2023-11-01 RX ORDER — PRAVASTATIN SODIUM 40 MG/1
40 TABLET ORAL
COMMUNITY
Start: 2015-08-20

## 2023-11-01 RX ORDER — OMEPRAZOLE 40 MG/1
40 CAPSULE, DELAYED RELEASE ORAL
COMMUNITY
Start: 2023-08-10

## 2023-11-01 RX ORDER — GABAPENTIN 100 MG/1
CAPSULE ORAL 3 TIMES DAILY
COMMUNITY
Start: 2023-07-14

## 2023-11-01 RX ORDER — CHOLECALCIFEROL (VITAMIN D3) 25 MCG
1 TABLET ORAL DAILY
COMMUNITY
Start: 2014-08-15

## 2023-11-02 ENCOUNTER — CLINICAL SUPPORT (OUTPATIENT)
Dept: CARDIAC REHAB | Facility: CLINIC | Age: 78
End: 2023-11-02
Payer: MEDICARE

## 2023-11-02 DIAGNOSIS — Z95.5 STENTED CORONARY ARTERY: ICD-10-CM

## 2023-11-02 NOTE — PROGRESS NOTES
Name: Chelsea Stinson   : 1945   Diagnosis: STENT  MRN: [id number]   Onset Date: 10/17/23    Today's Date: 23    Cardiovascular   HX:  STENT (JULIANN to LAD)  Family HX of CAD:  Yes  Angina: chest tightness  Describe: Tightness, burning across chest, L to R  Last Episode:10/16/2023  History of Heart Failure: No  EF: 50-55%  Devices:  No  HX of PAD: No    Arrythmias: normal sinus rhythm; PVCs, PACs, Bigeminy, T wave inversion  Apical: regular  Heart Rate: 62  BP:120/80  Radial pulses:  R Present 2+ L Present 2+    Comments: Remote STENT to OM/Ramus in ; Mitral Valve disease       Respiratory  HX: Denies   Dyspnea:  No  Describe:  HX JAYSHREE:  No  CPAP Use:  No  Family History of Lung Disease:  No    Resting O2 sat: 98%  Lung Sounds: Clear through all fields   Locations: all lobes    Comments: Mild dyspnea while in hospital post PCI but much improved now    Neurological   Orientation: oriented to person, place, time, and general circumstances  HX: Denies   History of stroke/TIA?: Denies     Comments:      Skin  Skin Color: Pink, warm, dry  Edema:  Not present, denies       Comments: Sees dermatologist annually for benign mole on R cheek     Gastrointestinal/Genitourinary  HX: GERD  Comments: Has taken medications in the past       Psychosocial  Marital status:   Children: 2 daughters in [place]  Lives alone:  No  Lives with:  (very ill, she is caretaker)  Drives:  Yes  Occupation: is retired - teacher in [place]   Caretaker of family member?:  Yes  Do you feel safe at home?:  Yes    Caffeinated drinks per day: 1/day tea or coffee   Alcoholic drinks per day/week: 1-2/week   HX drug or alcohol abuse?: No  Current use of illicit drugs?: No      Musculoskeletal  HX of injury/surgery: L hip waiting for replacement    Comments: Jeremie chi once/weekly which is helpful     Pain Assessment  Current pain: chronic  Location: L hip   Description: tightness/ache     Comments: Would like to have replacement but not  sure when due to 's health     Fall Risk Assessment  Assistive device: no device  Needs assistance:  No  Afraid of falling:  Yes  Fall within the past 6 months?: No  Injured with fall:  Fall risk results: high

## 2023-11-02 NOTE — PROGRESS NOTES
INDIVIDUAL CARDIAC TREATMENT PLAN-INITIAL ASSESSMENT     Name: Chelsea Stinson    Today's Date: 23   : 1945    Primary Provider: THUAN Mancini MD  MRN: [id number]    Referring Physician: TIFFANI Guevara MD     Diagnosis: STENT to LAD   Onset Date: 10/17/23      Risk Stratification: Moderate      NUTRITION ASSESSMENT  Lipids:  Lipid Lab Date: 10/16/23  Total Chol: 110  HDL: 48  LDL: 49  Tri  Cholesterol Med: Pravastatin 40mg daily     Diabetes: No    Weight Management  Weight: 141lbs  Height: 65 inches  BMI: 23.5  Body Composition: 34.6%  Waist Circumference: 34 inches  Current Diet: Heart Healthy  Barriers to dietary change: Denies     Initial Dietary Assessment Score: Awaiting score from RD      NUTRITION PLAN  Nutrition Goals:   1. Improve Picture Your Plate assessment results by discharge.  2. Make changes to diet to include heart healthy options while in the program.    Nutrition Intervention/Education:   *Sent dietician Picture Your Plate assessment for scoring and recommendations.   *Perform weekly weight checks on .   *Body Composition completed by Exercise Physiologist.  *Will encourage engagement in education specific to nutrition and weight management     OTHER CORE COMPONENTS/ RISK FACTORS ASSESSMENT  Medication compliance: good compliance  Using pill box: No  Carries medication list: Yes    Blood Pressure Management:  History of High BP: Yes  Resting BP: 120/80    Tobacco: NEVER  Anyone in the house smoke: No    Initial Knowledge Test Score: 11/15    OTHER CORE COMPONENTS/ RISK FACTOR PLAN   Other Core components/Risk Factor Goals:                                                                                                                                                      1. Achieve and maintain a resting blood pressure less than 130/80 while in the program.  2. Gain knowledge of cardiac disease and lifestyle modifications related to exercise and ADL's prior to  "discharge.    Other Components/ Risk Factors Intervention/Education:  *Will continue to monitor HR, BP, dyspnea and arrhythmias each session.   *Will meet 1:1 with patient to discuss goals & progress.  *Will encourage engagement in education specific to risk factor management and behavior modification       PSYCHOSOCIAL ASSESSMENT  Patient reported stress level: moderate  Using stress management skills: Yes  HX of anxiety: Yes  HX of depression: Yes - new within the last year due to 's health     Family/Support System: Family, friends, [employer] (very involved)  Seeing mental health provider: No  Psychosocial medications: Amitriptyline 10mg daily    Initial PHQ-9 score: 2 (no risk)  Was PHQ-9 faxed to provider: No    Quality of Life Survey: SF-36  PCS: 22.47  MCS: 52.55    Status of change:  Preparation    PSYCHOSOCIAL PLAN  Psychosocial Goals:  1. Improve stage of change while in the program.  2. To maintain PHQ-9 category classification \"NONE\" while in the program     Psychosocial Interventions/ Education:  * Provided one on one emotional support and will facilitate peer support within the context of other phase II patients while in the program.   *Will encourage engagement in education specific to mental health       EXERCISE ASSESSMENT  Home Exercise: Yes  Frequency: one day/weekly   Mode: Jeremie Chi at  [employer]     EXERCISE PLAN  Exercise Goals:   1. Goal of 3.9 METs by discharge.  2. Have a plan in place for continued exercise after the program by discharge.    Exercise Prescription:   Based on 12 Minute Walk Test  Frequency: 3 days per week  Duration (total aerobic min.): 30 minutes  Intensity RPE: 11-14  Target HR: Rest+30 (99-130bpm)  MET Level Range: 1.8-2.5    Date of first exercise session:     Modality METS Load  Duration   1 Warm Up    05:00   2 Treadmill 2.1 1.2mph 1% 06:00   3 Arm Ergometer 2 2 Elder   06:00   4 NuStep 2 25 Elder 2 06:00   5 Recumbent Bike 2.5 10 Elder  06:00   6 Upright Bike " 2.5 10 Elder  06:00   7 Cooldown     05:00     Exercise Intervention:   *Aim to progress 0.5 MET every 4-5 weeks per reported pain/symptoms   *Incorporate resistance training for muscular endurance and strength when appropriate  *Will encourage engagement in education specific to home exercise guidelines       LEARNING ASSESSMENT & BARRIERS  Readiness to Learn: Eager to learn; asks appropriate questions   Barriers: None    FALL RISK  High   Comments: Does young chi but also hoping to improve balance while in CR    INDIVIDUAL PATIENT GOALS:  1.To improve strength and endurance by discharge  2.To adopt regular, comprehensive home exercise program by discharge    MEDICATIONS  Metoprolol 100mg daily; Lisinopril 20mg daily; Levothyroxine 25mcg daily; Pravastatin 40mg daily; Amitriptyline 10mg daily @HS; Hydrochlorothiazide 25mg daily; Centrum Silver vitamin 1 tab daily; Aspirin 81mg daily; Vitamin D3 2000 Units/daily; Nexium 40mg daily        STAFF COMMENTS:   Patient completed the orientation process for phase 2 cardiac rehabilitation, following her recent PCI with stent (JULIANN to LAD) on 10/17/2023. Very pleasant, good historian. She was able to perform 12 minute walk test on treadmill, achieving 2.3 METS with no reported angina or dyspnea when questioned.  Her chief limitation is moderate L hip pain, which she is waiting for the right time to have it replaced. Telemetry showed SR with PVCs, PACs and short periods of bigeminy; inverted T waves also noted. Vital signs stable at rest and with exertion.  It was noticed on her current medication list, provided by the patient, that she is not taking an antiplatelet at this time, but unsure why.  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 This will be an area of focus while she is in CR as she reports newly diagnosed anxiety and depression. We hope to help Chelsea improve her overall cardiovascular health while in CR but also establish a regular  exercise program. Thank you for referring Curt

## 2023-11-04 NOTE — PROGRESS NOTES
I reviewed the above documentation and agree with the medical decision making as documented in the note.     Aaron Guevara MD

## 2023-11-06 ENCOUNTER — TRANSCRIBE ORDERS (OUTPATIENT)
Dept: CARDIAC REHAB | Facility: CLINIC | Age: 78
End: 2023-11-06
Payer: MEDICARE

## 2023-11-06 DIAGNOSIS — Z95.5 STENTED CORONARY ARTERY: Primary | ICD-10-CM

## 2023-11-07 ENCOUNTER — PATIENT OUTREACH (OUTPATIENT)
Dept: CARE COORDINATION | Facility: CLINIC | Age: 78
End: 2023-11-07
Payer: MEDICARE

## 2023-11-08 ENCOUNTER — CLINICAL SUPPORT (OUTPATIENT)
Dept: CARDIAC REHAB | Facility: CLINIC | Age: 78
End: 2023-11-08
Payer: MEDICARE

## 2023-11-08 DIAGNOSIS — Z95.5 STENTED CORONARY ARTERY: ICD-10-CM

## 2023-11-08 PROCEDURE — 93798 PHYS/QHP OP CAR RHAB W/ECG: CPT | Performed by: INTERNAL MEDICINE

## 2023-11-09 NOTE — PROGRESS NOTES
INITIAL PICTURE YOUR PLATE ASSESSMENT  CARDIAC REHAB    SCORES:  Vegetables & Fruit (out of 12)                 9   Breads, Grains & Cereals (out of 12)        4  Red & Processed Meat (out of 12)         9  Poultry (out of 2)                                   2  Fish & Shellfish (out of 4)                      2  Beans, Nuts & Seeds (out of 4)             0  Milk & Dairy Foods (out of 6)              4  Toppings, Oils, Seasonings & Salt (out of 20)    12  Sweets, Snacks & Restaurant Food (out of 14)    11  Beverages (out of 10)          10     Overall Score (out of 96)    63    DIAGNOSIS  Inadequate intake of whole grains  Inadequate intake of beans, nuts and seeds.    GOALS  Aim increase intake of whole grains by replacing refined/white grains with more whole grain products. Bread, Cereal, Grain tip sheet provided.   Aim to include a serving of beans, nuts and seeds in diet weekly.  Bean, Nuts and Seed tip sheet provided.     Patient provided dietitian phone number for any further diet related questions.

## 2023-11-10 ENCOUNTER — APPOINTMENT (OUTPATIENT)
Dept: CARDIAC REHAB | Facility: CLINIC | Age: 78
End: 2023-11-10
Payer: MEDICARE

## 2023-11-13 ENCOUNTER — CLINICAL SUPPORT (OUTPATIENT)
Dept: CARDIAC REHAB | Facility: CLINIC | Age: 78
End: 2023-11-13
Payer: MEDICARE

## 2023-11-13 DIAGNOSIS — Z95.5 STENTED CORONARY ARTERY: ICD-10-CM

## 2023-11-13 PROCEDURE — 93798 PHYS/QHP OP CAR RHAB W/ECG: CPT | Performed by: INTERNAL MEDICINE

## 2023-11-15 ENCOUNTER — CLINICAL SUPPORT (OUTPATIENT)
Dept: CARDIAC REHAB | Facility: CLINIC | Age: 78
End: 2023-11-15
Payer: MEDICARE

## 2023-11-15 DIAGNOSIS — Z95.5 STENTED CORONARY ARTERY: ICD-10-CM

## 2023-11-15 PROCEDURE — 93798 PHYS/QHP OP CAR RHAB W/ECG: CPT | Performed by: INTERNAL MEDICINE

## 2023-11-17 ENCOUNTER — CLINICAL SUPPORT (OUTPATIENT)
Dept: CARDIAC REHAB | Facility: CLINIC | Age: 78
End: 2023-11-17
Payer: MEDICARE

## 2023-11-17 DIAGNOSIS — Z95.5 STENTED CORONARY ARTERY: ICD-10-CM

## 2023-11-17 PROCEDURE — 93798 PHYS/QHP OP CAR RHAB W/ECG: CPT | Performed by: INTERNAL MEDICINE

## 2023-11-20 ENCOUNTER — PATIENT OUTREACH (OUTPATIENT)
Dept: CARE COORDINATION | Facility: CLINIC | Age: 78
End: 2023-11-20
Payer: MEDICARE

## 2023-11-20 ENCOUNTER — CLINICAL SUPPORT (OUTPATIENT)
Dept: CARDIAC REHAB | Facility: CLINIC | Age: 78
End: 2023-11-20
Payer: MEDICARE

## 2023-11-20 DIAGNOSIS — Z95.5 STENTED CORONARY ARTERY: ICD-10-CM

## 2023-11-20 PROCEDURE — 93798 PHYS/QHP OP CAR RHAB W/ECG: CPT | Performed by: INTERNAL MEDICINE

## 2023-11-22 ENCOUNTER — CLINICAL SUPPORT (OUTPATIENT)
Dept: CARDIAC REHAB | Facility: CLINIC | Age: 78
End: 2023-11-22
Payer: MEDICARE

## 2023-11-22 DIAGNOSIS — Z95.5 STENTED CORONARY ARTERY: ICD-10-CM

## 2023-11-27 ENCOUNTER — CLINICAL SUPPORT (OUTPATIENT)
Dept: CARDIAC REHAB | Facility: CLINIC | Age: 78
End: 2023-11-27
Payer: MEDICARE

## 2023-11-27 DIAGNOSIS — Z95.5 STENTED CORONARY ARTERY: ICD-10-CM

## 2023-11-29 ENCOUNTER — CLINICAL SUPPORT (OUTPATIENT)
Dept: CARDIAC REHAB | Facility: CLINIC | Age: 78
End: 2023-11-29
Payer: MEDICARE

## 2023-11-29 DIAGNOSIS — Z95.5 STENTED CORONARY ARTERY: ICD-10-CM

## 2023-11-29 PROCEDURE — 93798 PHYS/QHP OP CAR RHAB W/ECG: CPT | Performed by: INTERNAL MEDICINE

## 2023-11-29 NOTE — PROGRESS NOTES
INDIVIDUAL CARDIAC TREATMENT PLAN-30 DAY REASSESSMENT      Name: Chelsea Stinson    Today's Date: 23   : 1945    Primary Provider: THUAN Mancini MD  MRN: [id number]    Referring Physician: TIFFANI Guevara MD     Diagnosis: STENT to LAD   Onset Date: 10/17/23      Risk Stratification: Moderate      NUTRITION REASSESSMENT  Lipids:  Lipid Lab Date: 10/16/23  Total Chol: 110  HDL: 48  LDL: 49  Tri  Cholesterol Med: Pravastatin 40mg daily     Diabetes: No    Weight Management  Weight: 141lbs  Height: 65 inches  BMI: 23.5  Body Composition: 34.6%  Waist Circumference: 34 inches  Current Diet: Heart Healthy  Barriers to dietary change: Denies     Initial Dietary Assessment Score: 63/96    NUTRITION PLAN  Nutrition Goals:   1. Improve Picture Your Plate assessment results by discharge. Initial score 63/96. Will re-assess within last 3 sessions of CR.  2. Make changes to diet to include heart healthy options while in the program. In progress.     Nutrition Intervention/Education:   *Reviewed initial Picture Your Plate survey results with patient - included score, goals, tip sheets and RD contact information to schedule 1:1 visit.  *Performing weekly weight checks on . Weight maintained thus far.   *Will encourage engagement in education specific to nutrition and weight management     OTHER CORE COMPONENTS/ RISK FACTORS REASSESSMENT  Medication compliance: good compliance  Using pill box: No  Carries medication list: Yes    Blood Pressure Management:  History of High BP: Yes  Resting BP: 128/80    Tobacco: NEVER  Anyone in the house smoke: No    Initial Knowledge Test Score: 11/15    OTHER CORE COMPONENTS/ RISK FACTOR PLAN   Other Core components/Risk Factor Goals:                                                                                                                                                      1. Achieve and maintain a resting blood pressure less than 130/80 while in the program. In  "progress, meeting consistently thus far.   2. Gain knowledge of cardiac disease and lifestyle modifications related to exercise and ADL's prior to discharge. In progress, pt engaged in education and asks appropriate questions.     Other Components/ Risk Factors Intervention/Education:  *Monitoring HR, BP, dyspnea and arrhythmias each session. Tele showing SR with occasional PVCs, rare PACs. Demonstrates stable cardiovascular response to exercise. Asymptomatic.   *Meeting 1:1 with patient to discuss goals & progress.   *Education provided: Cardiac Anatomy & Physiology; Cardiac Diagnosis & Treatment; Electrical System; Session with the Pharmacist      PSYCHOSOCIAL REASSESSMENT  Patient reported stress level: moderate  Using stress management skills: Yes  HX of anxiety: Yes  HX of depression: Yes - new within the last year due to 's health     Family/Support System: Family, friends, [employer] (very involved)  Seeing mental health provider: No  Psychosocial medications: Amitriptyline 10mg daily    Initial PHQ-9 score: 2 (no risk)  Was PHQ-9 faxed to provider: No    Quality of Life Survey: SF-36  PCS: 22.47  MCS: 52.55    Stage of change:  Preparation    PSYCHOSOCIAL PLAN  Psychosocial Goals:  1. Improve stage of change to ACTION while in the program. Currently remains in Preparation.   2. To maintain PHQ-9 category classification \"NONE\" while in the program. In progress     Psychosocial Interventions/ Education:  *Providing one on one emotional support and facilitating peer support within the context of other phase II patients while in the program.   *Pt expresses enjoyment with class structure and connections with fellow patients  *Pt reports increased stress due to 's illness- encouraged to find time for herself.  *Will encourage engagement in education specific to mental health       EXERCISE REASSESSMENT  Home Exercise: Yes  Frequency: one day/weekly   Mode: Jeremie Chi at  [employer]     EXERCISE " PLAN  Exercise Goals:   1. Goal of 3.9 METs by discharge. In progress. Currently working at 3.8 METs on upright bike  2. Have a plan in place for continued exercise after the program by discharge. In progress, will discuss within last two weeks of program. Has membership at  [employer].     Exercise Prescription:   Based on 12 Minute Walk Test  Frequency: 3 days per week  Duration (total aerobic min.): 30 minutes  Intensity RPE: 11-14  Target HR: Rest+30 (99-130bpm)  MET Level Range: 2.1-3.8    Date of first exercise session:     Modality METS Load  Duration   1 Warm Up    05:00   2 Treadmill 2.1 1.2mph 1% 06:00   3 Arm Ergometer 2.4 8 Elder   06:00   4 NuStep 2.5 46 Elder 3 06:00   5 Recumbent Bike 3.3 26 Elder 1 06:00   6 Upright Bike 3.8 38 Elder 2 06:00   7 Cooldown     05:00     Exercise Intervention:   *Aiming to progress 0.5 MET every 4-5 weeks per reported pain/symptoms   *Will introduce resistance training for muscular endurance and strength within next week  *Education provided: Resistance Training Part 1&2; Flexibility      LEARNING ASSESSMENT & BARRIERS  Readiness to Learn: Eager to learn; asks appropriate questions   Barriers: None    FALL RISK  High   Comments: Does young chi but also hoping to improve balance while in CR    INDIVIDUAL PATIENT GOALS:  1.To improve strength and endurance by discharge. In progress, pt reports she is noticing improvements already.  2.To adopt regular, comprehensive home exercise program by discharge. In progress, will provide home exercise guidelines.    MEDICATIONS  Metoprolol 100mg daily; Lisinopril 20mg daily; Levothyroxine 25mcg daily; Pravastatin 40mg daily; Amitriptyline 10mg daily @[facility]; Hydrochlorothiazide 25mg daily; Centrum Silver vitamin 1 tab daily; Aspirin 81mg daily; Vitamin D3 2000 Units/daily; Nexium 40mg daily        STAFF COMMENTS:   Chelsea has completed 5/12 sessions of phase 2 cardiac rehabilitation, following her recent PCI with stent (JULIANN to LAD) on  10/17/2023.  In just a short time, Chelsea is already reporting improved exercise tolerance and is demonstrating a 52% improvement in exercise capacity. Telemetry showing SR with PVCs, PACs and short periods of bigeminy.  Vital signs stable at rest and with exertion.  She reports chronic L hip pain but exercise has not made it worse, nor has it hindered her exercise prescription.  Chelsea reports moderate to high stress levels as caretaker of her ill . With newly diagnosed anxiety and depression, we will work on stress management and provide the needed emotional support as she moves through her CR program.  Thank you for referring Chelsea to our program.

## 2023-12-01 ENCOUNTER — CLINICAL SUPPORT (OUTPATIENT)
Dept: CARDIAC REHAB | Facility: CLINIC | Age: 78
End: 2023-12-01
Payer: MEDICARE

## 2023-12-01 DIAGNOSIS — Z95.5 STENTED CORONARY ARTERY: ICD-10-CM

## 2023-12-01 PROCEDURE — 93798 PHYS/QHP OP CAR RHAB W/ECG: CPT | Performed by: INTERNAL MEDICINE

## 2023-12-04 ENCOUNTER — CLINICAL SUPPORT (OUTPATIENT)
Dept: CARDIAC REHAB | Facility: CLINIC | Age: 78
End: 2023-12-04
Payer: MEDICARE

## 2023-12-04 DIAGNOSIS — Z95.5 STENTED CORONARY ARTERY: ICD-10-CM

## 2023-12-04 PROCEDURE — 93798 PHYS/QHP OP CAR RHAB W/ECG: CPT | Performed by: INTERNAL MEDICINE

## 2023-12-06 ENCOUNTER — CLINICAL SUPPORT (OUTPATIENT)
Dept: CARDIAC REHAB | Facility: CLINIC | Age: 78
End: 2023-12-06
Payer: MEDICARE

## 2023-12-06 DIAGNOSIS — Z95.5 STENTED CORONARY ARTERY: ICD-10-CM

## 2023-12-06 PROCEDURE — 93798 PHYS/QHP OP CAR RHAB W/ECG: CPT | Performed by: INTERNAL MEDICINE

## 2023-12-08 ENCOUNTER — CLINICAL SUPPORT (OUTPATIENT)
Dept: CARDIAC REHAB | Facility: CLINIC | Age: 78
End: 2023-12-08
Payer: MEDICARE

## 2023-12-08 DIAGNOSIS — Z95.5 STENTED CORONARY ARTERY: ICD-10-CM

## 2023-12-08 PROCEDURE — 93798 PHYS/QHP OP CAR RHAB W/ECG: CPT | Performed by: INTERNAL MEDICINE

## 2023-12-11 ENCOUNTER — CLINICAL SUPPORT (OUTPATIENT)
Dept: CARDIAC REHAB | Facility: CLINIC | Age: 78
End: 2023-12-11
Payer: MEDICARE

## 2023-12-11 DIAGNOSIS — Z95.5 STENTED CORONARY ARTERY: ICD-10-CM

## 2023-12-11 PROCEDURE — 93798 PHYS/QHP OP CAR RHAB W/ECG: CPT | Performed by: INTERNAL MEDICINE

## 2023-12-13 ENCOUNTER — CLINICAL SUPPORT (OUTPATIENT)
Dept: CARDIAC REHAB | Facility: CLINIC | Age: 78
End: 2023-12-13
Payer: MEDICARE

## 2023-12-13 DIAGNOSIS — Z95.5 STENTED CORONARY ARTERY: ICD-10-CM

## 2023-12-13 PROCEDURE — 93798 PHYS/QHP OP CAR RHAB W/ECG: CPT

## 2023-12-15 ENCOUNTER — CLINICAL SUPPORT (OUTPATIENT)
Dept: CARDIAC REHAB | Facility: CLINIC | Age: 78
End: 2023-12-15
Payer: MEDICARE

## 2023-12-15 DIAGNOSIS — Z95.5 STENTED CORONARY ARTERY: ICD-10-CM

## 2023-12-15 PROCEDURE — 93798 PHYS/QHP OP CAR RHAB W/ECG: CPT

## 2023-12-18 ENCOUNTER — CLINICAL SUPPORT (OUTPATIENT)
Dept: CARDIAC REHAB | Facility: CLINIC | Age: 78
End: 2023-12-18
Payer: MEDICARE

## 2023-12-18 DIAGNOSIS — Z95.5 STENTED CORONARY ARTERY: ICD-10-CM

## 2023-12-18 PROCEDURE — 93798 PHYS/QHP OP CAR RHAB W/ECG: CPT | Performed by: INTERNAL MEDICINE

## 2023-12-20 ENCOUNTER — CLINICAL SUPPORT (OUTPATIENT)
Dept: CARDIAC REHAB | Facility: CLINIC | Age: 78
End: 2023-12-20
Payer: MEDICARE

## 2023-12-20 DIAGNOSIS — Z95.5 STENTED CORONARY ARTERY: ICD-10-CM

## 2023-12-22 ENCOUNTER — CLINICAL SUPPORT (OUTPATIENT)
Dept: CARDIAC REHAB | Facility: CLINIC | Age: 78
End: 2023-12-22
Payer: MEDICARE

## 2023-12-22 DIAGNOSIS — Z95.5 STENTED CORONARY ARTERY: ICD-10-CM

## 2023-12-22 PROCEDURE — 93798 PHYS/QHP OP CAR RHAB W/ECG: CPT | Performed by: INTERNAL MEDICINE

## 2023-12-27 ENCOUNTER — CLINICAL SUPPORT (OUTPATIENT)
Dept: CARDIAC REHAB | Facility: CLINIC | Age: 78
End: 2023-12-27
Payer: MEDICARE

## 2023-12-27 DIAGNOSIS — Z95.5 STENTED CORONARY ARTERY: ICD-10-CM

## 2023-12-27 PROCEDURE — 93798 PHYS/QHP OP CAR RHAB W/ECG: CPT | Performed by: INTERNAL MEDICINE

## 2023-12-29 ENCOUNTER — CLINICAL SUPPORT (OUTPATIENT)
Dept: CARDIAC REHAB | Facility: CLINIC | Age: 78
End: 2023-12-29
Payer: MEDICARE

## 2023-12-29 DIAGNOSIS — Z95.5 STENTED CORONARY ARTERY: ICD-10-CM

## 2023-12-29 PROCEDURE — 93798 PHYS/QHP OP CAR RHAB W/ECG: CPT

## 2024-01-03 ENCOUNTER — CLINICAL SUPPORT (OUTPATIENT)
Dept: CARDIAC REHAB | Facility: CLINIC | Age: 79
End: 2024-01-03
Payer: MEDICARE

## 2024-01-03 DIAGNOSIS — Z95.5 STENTED CORONARY ARTERY: ICD-10-CM

## 2024-01-03 PROCEDURE — 93798 PHYS/QHP OP CAR RHAB W/ECG: CPT

## 2024-01-05 ENCOUNTER — CLINICAL SUPPORT (OUTPATIENT)
Dept: CARDIAC REHAB | Facility: CLINIC | Age: 79
End: 2024-01-05
Payer: MEDICARE

## 2024-01-05 DIAGNOSIS — Z95.5 STENTED CORONARY ARTERY: ICD-10-CM

## 2024-01-05 PROCEDURE — 93798 PHYS/QHP OP CAR RHAB W/ECG: CPT

## 2024-01-08 ENCOUNTER — CLINICAL SUPPORT (OUTPATIENT)
Dept: CARDIAC REHAB | Facility: CLINIC | Age: 79
End: 2024-01-08
Payer: MEDICARE

## 2024-01-08 DIAGNOSIS — Z95.5 STENTED CORONARY ARTERY: ICD-10-CM

## 2024-01-08 PROCEDURE — 93798 PHYS/QHP OP CAR RHAB W/ECG: CPT

## 2024-01-08 NOTE — PROGRESS NOTES
INDIVIDUAL CARDIAC TREATMENT PLAN-60 DAY REASSESSMENT      Name: Chelsea Stinson    Today's Date: 24   : 1945    Primary Provider: THUAN Mancini MD  MRN: [id number]    Referring Physician: TIFFANI Guevara MD     Diagnosis: STENT to LAD   Onset Date: 10/17/23      Risk Stratification: Moderate      NUTRITION REASSESSMENT  Lipids:  Lipid Lab Date: 10/16/23  Total Chol: 110  HDL: 48  LDL: 49  Tri  Cholesterol Med: Pravastatin 40mg daily     Diabetes: No    Weight Management  Weight: 140lbs  Height: 65 inches  BMI: 23.2  Body Composition: 34.6%  Waist Circumference: 34 inches  Current Diet: Heart Healthy  Barriers to dietary change: Denies     Initial Dietary Assessment Score: 63/96    NUTRITION PLAN  Nutrition Goals:   1. Improve Picture Your Plate assessment results by discharge. Initial score 63/96. Will re-assess within last 3 sessions of CR.   2. Make changes to diet to include heart healthy options while in the program. In progress. Working on increasing whole grains and beans/nuts/seeds.  Will re-evaluate with follow up diet survey within last three weeks of CR.     Nutrition Intervention/Education:   *Encouraged to make  1:1 appointment with RD while in CR.   *Performing weekly weight checks on . Weight maintained thus far.   *Education provided: Body Composition/BMI; Individual Cholesterol Levels; Managing Cholesterol  *Education provided by RD: Healthier Eating Out & Snacking; Heart Healthy Shopping & Cooking; Portion Distortion    OTHER CORE COMPONENTS/ RISK FACTORS REASSESSMENT  Medication compliance: good compliance  Using pill box: No  Carries medication list: Yes    Blood Pressure Management:  History of High BP: Yes  Resting BP: 156/70    Tobacco: NEVER  Anyone in the house smoke: No    Initial Knowledge Test Score: 11/15    OTHER CORE COMPONENTS/ RISK FACTOR PLAN   Other Core components/Risk Factor Goals:                                                                                 "                                                                      1. Achieve and maintain a resting blood pressure less than 130/80 while in the program. In progress, having difficulty with elevated BP over last few weeks.  Increased stress at home with 's health.  Resting BP as high as 180/70 on 1/3/24.  2. Gain knowledge of cardiac disease and lifestyle modifications related to exercise and ADL's prior to discharge. In progress, pt engaged in education and asks appropriate questions.     Other Components/ Risk Factors Intervention/Education:  *Monitoring HR, BP, dyspnea and arrhythmias each session. Tele showing SR with frequent PACs, PVCs, COUPLETS, bigeminy. Demonstrates stable cardiovascular response to exercise. Asymptomatic.   *Meeting 1:1 with patient to discuss goals & progress.   *Education provided: Cardiac Anatomy & Physiology; Cardiac Diagnosis & Treatment; Electrical System; Session with the Pharmacist; Stroke Awareness; PAD; Living with Heart Failure & Hands Only CPR; HTN      PSYCHOSOCIAL REASSESSMENT  Patient reported stress level: moderate  Using stress management skills: Yes  HX of anxiety: Yes  HX of depression: Yes - new within the last year due to 's health     Family/Support System: Family, friends, Anab[employer]ist (very involved)  Seeing mental health provider: No  Psychosocial medications: Amitriptyline 10mg daily    Initial PHQ-9 score: 2 (no risk)  Was PHQ-9 faxed to provider: No    Quality of Life Survey: SF-36  PCS: 22.47  MCS: 52.55    Stage of change:  Preparation    PSYCHOSOCIAL PLAN  Psychosocial Goals:  1. Improve stage of change to ACTION while in the program. Currently remains in Preparation.   2. To maintain PHQ-9 category classification \"NONE\" while in the program. In progress. Will administer midway evaluation next week.     Psychosocial Interventions/ Education:  *Providing one on one emotional support and facilitating peer support within the context of other phase II " patients while in the program.   *Pt expresses enjoyment with class structure and connections with fellow patients  *Pt reports increased stress due to 's illness- encouraged to find time for herself.  *Education provided: Emotional Aspects of Heart Disease Part 1 (Depression & Attitude), Part 2 (Happiness is a Choice), Part 3 (Support Group)      EXERCISE REASSESSMENT  Home Exercise: Yes  Frequency: one day/weekly   Mode: Jeremie Chi at  [employer]     EXERCISE PLAN  Exercise Goals:   1. Goal of 3.9 METs by discharge. In progress. MET, currently working at 4.2 METs on recumbent bike.  2. Have a plan in place for continued exercise after the program by discharge. In progress, will discuss within last two weeks of program. Has membership at  [employer].     Exercise Prescription:   Based on 12 Minute Walk Test  Frequency: 3 days per week  Duration (total aerobic min.): 30 minutes  Intensity RPE: 11-14  Target HR: Rest+30 (99-130bpm)  MET Level Range: 2.3-4.2    Date of first exercise session:     Modality METS Load  Duration   1 Warm Up    05:00   2 Treadmill 2.6 1.7mph 1% 06:00   3 Recumbent Bike 4.2 44 Elder  L2 06:00   4 NuStep 2.5 46 Elder L6 06:00   5 Recumbent Bike 4.2 44 Elder L2 06:00   6 Weights  70lbs  06:00   7 Cooldown     05:00     Exercise Intervention:   *Aiming to progress 0.5 MET every 4-5 weeks per reported pain/symptoms/BP   *Introduced resistance training with no issues  *Education provided: Resistance Training Part 1&2; Flexibility; Maintenance Exercise; Intimacy & Heart Disease; Individual MET Levels; Temperature Extremes & Hydration; Diabetes & Exercise      LEARNING ASSESSMENT & BARRIERS  Readiness to Learn: Eager to learn; asks appropriate questions   Barriers: None    FALL RISK  High   Comments: Does jeremie chi but also hoping to improve balance while in CR    INDIVIDUAL PATIENT GOALS:  1.To improve strength and endurance by discharge. In progress, pt reports she is noticing  significant improvements.  2.To adopt regular, comprehensive home exercise program by discharge. In progress, will provide home exercise guidelines. Has returned to Jeremie chi at  [employer].     MEDICATIONS  Metoprolol 100mg daily; Lisinopril 20mg daily; Levothyroxine 25mcg daily; Pravastatin 40mg daily; Amitriptyline 10mg daily @[facility]; Hydrochlorothiazide 25mg daily; Centrum Silver vitamin 1 tab daily; Aspirin 81mg daily; Vitamin D3 2000 Units/daily; Nexium 40mg daily        STAFF COMMENTS:   Chelsea has completed 19/36 sessions of phase 2 cardiac rehabilitation, following her recent PCI with stent (JULIANN to LAD) on 10/17/2023. Initially, Katya was only planning to do 12 sessions but has decided to stay for the entire 36 sessions. Thus far, Katya has improved her exercise capacity by 68%, allowing her to work at a MET level range of 2.6-4.2. Telemetry showing SR with PVCs, PACs and short periods of bigeminy and couplets. Over the last few weeks, Katya's resting BP readings have been elevated, she attributes it to stress at home with 's health. She reports chronic L hip pain but exercise has not made it worse, nor has it hindered her exercise prescription.  Chelsea reports moderate to high stress levels as caretaker of her ill . With newly diagnosed anxiety and depression, we will work on stress management and provide the needed emotional support as she moves through her CR program.  Thank you for referring Chelsea to our program.

## 2024-01-10 ENCOUNTER — CLINICAL SUPPORT (OUTPATIENT)
Dept: CARDIAC REHAB | Facility: CLINIC | Age: 79
End: 2024-01-10
Payer: MEDICARE

## 2024-01-10 DIAGNOSIS — Z95.5 STENTED CORONARY ARTERY: ICD-10-CM

## 2024-01-10 PROCEDURE — 93798 PHYS/QHP OP CAR RHAB W/ECG: CPT

## 2024-01-12 ENCOUNTER — CLINICAL SUPPORT (OUTPATIENT)
Dept: CARDIAC REHAB | Facility: CLINIC | Age: 79
End: 2024-01-12
Payer: MEDICARE

## 2024-01-12 DIAGNOSIS — Z95.5 STENTED CORONARY ARTERY: ICD-10-CM

## 2024-01-12 PROCEDURE — 93798 PHYS/QHP OP CAR RHAB W/ECG: CPT

## 2024-01-15 ENCOUNTER — CLINICAL SUPPORT (OUTPATIENT)
Dept: CARDIAC REHAB | Facility: CLINIC | Age: 79
End: 2024-01-15
Payer: MEDICARE

## 2024-01-15 DIAGNOSIS — Z95.5 STENTED CORONARY ARTERY: ICD-10-CM

## 2024-01-15 PROCEDURE — 93798 PHYS/QHP OP CAR RHAB W/ECG: CPT

## 2024-01-17 ENCOUNTER — CLINICAL SUPPORT (OUTPATIENT)
Dept: CARDIAC REHAB | Facility: CLINIC | Age: 79
End: 2024-01-17
Payer: MEDICARE

## 2024-01-17 DIAGNOSIS — Z95.5 STENTED CORONARY ARTERY: ICD-10-CM

## 2024-01-17 PROCEDURE — 93798 PHYS/QHP OP CAR RHAB W/ECG: CPT

## 2024-01-18 NOTE — PROGRESS NOTES
FOLLOW UP PICTURE YOUR PLATE DIET ASSESSMENT  CARDIAC REHAB    SCORES:  Vegetables & Fruit (out of 12)                 12   Breads, Grains & Cereals (out of 12)        6  Red & Processed Meat (out of 12)         6  Poultry (out of 2)                                   2  Fish & Shellfish (out of 4)                      1  Beans, Nuts & Seeds (out of 4)             1  Milk & Dairy Foods (out of 6)              6  Toppings, Oils, Seasonings & Salt (out of 20)    17  Sweets, Snacks & Restaurant Food (out of 14)    10  Beverages (out of 10)          8     Overall Score (out of 96)    69  Pre vs Post Overall Score Change:  INCREASE/DECREASE: increase from 63     GOALS  Aim increase intake of whole grains by replacing refined/white grains with more whole grain products.   Aim to include a serving of beans, nuts and seeds in diet weekly.    GOALS MET:  YES /NO: Yes -   YES /NO: partially met (nuts)

## 2024-01-19 ENCOUNTER — CLINICAL SUPPORT (OUTPATIENT)
Dept: CARDIAC REHAB | Facility: CLINIC | Age: 79
End: 2024-01-19
Payer: MEDICARE

## 2024-01-19 DIAGNOSIS — Z95.5 STENTED CORONARY ARTERY: ICD-10-CM

## 2024-01-19 PROCEDURE — 93798 PHYS/QHP OP CAR RHAB W/ECG: CPT | Performed by: INTERNAL MEDICINE

## 2024-01-22 ENCOUNTER — CLINICAL SUPPORT (OUTPATIENT)
Dept: CARDIAC REHAB | Facility: CLINIC | Age: 79
End: 2024-01-22
Payer: MEDICARE

## 2024-01-22 DIAGNOSIS — Z95.5 STENTED CORONARY ARTERY: ICD-10-CM

## 2024-01-22 PROCEDURE — 93798 PHYS/QHP OP CAR RHAB W/ECG: CPT | Performed by: INTERNAL MEDICINE

## 2024-01-24 ENCOUNTER — CLINICAL SUPPORT (OUTPATIENT)
Dept: CARDIAC REHAB | Facility: CLINIC | Age: 79
End: 2024-01-24
Payer: MEDICARE

## 2024-01-24 DIAGNOSIS — Z95.5 STENTED CORONARY ARTERY: ICD-10-CM

## 2024-01-24 PROCEDURE — 93798 PHYS/QHP OP CAR RHAB W/ECG: CPT

## 2024-01-26 ENCOUNTER — CLINICAL SUPPORT (OUTPATIENT)
Dept: CARDIAC REHAB | Facility: CLINIC | Age: 79
End: 2024-01-26
Payer: MEDICARE

## 2024-01-26 DIAGNOSIS — Z95.5 STENTED CORONARY ARTERY: ICD-10-CM

## 2024-01-26 PROCEDURE — 93798 PHYS/QHP OP CAR RHAB W/ECG: CPT

## 2024-01-29 ENCOUNTER — CLINICAL SUPPORT (OUTPATIENT)
Dept: CARDIAC REHAB | Facility: CLINIC | Age: 79
End: 2024-01-29
Payer: MEDICARE

## 2024-01-29 DIAGNOSIS — Z95.5 STENTED CORONARY ARTERY: ICD-10-CM

## 2024-01-29 PROCEDURE — 93798 PHYS/QHP OP CAR RHAB W/ECG: CPT

## 2024-01-29 NOTE — PROGRESS NOTES
INDIVIDUAL CARDIAC TREATMENT PLAN-90 DAY REASSESSMENT      Name: Chelsea Stinson    Today's Date: 24   : 1945    Primary Provider: THUAN Mancini MD  MRN: [id number]    Referring Physician: TIFFANI Guevraa MD     Diagnosis: STENT to LAD   Onset Date: 10/17/23      Risk Stratification: Moderate      NUTRITION REASSESSMENT  Lipids:  Lipid Lab Date: 10/16/23  Total Chol: 110  HDL: 48  LDL: 49  Tri  Cholesterol Med: Pravastatin 40mg daily     Diabetes: No    Weight Management  Weight: 138lbs  Height: 65 inches  BMI: 23  Body Composition: 34.6%  Waist Circumference: 34 inches  Current Diet: Heart Healthy  Barriers to dietary change: Denies     Initial Dietary Assessment Score: 63/96    NUTRITION PLAN  Nutrition Goals:   1. Improve Picture Your Plate assessment results by discharge. Initial score 63/96. Will re-assess within last 3 sessions of CR.   2. Make changes to diet to include heart healthy options while in the program. In progress. Working on increasing whole grains and beans/nuts/seeds.  Will re-evaluate with follow up diet survey within last three weeks of CR.     Nutrition Intervention/Education:   *Encouraged to make  1:1 appointment with RD while in CR.   *Performing weekly weight checks on . Patient has lost 3lbs since beginning CR.   *Education provided: Body Composition/BMI; Individual Cholesterol Levels; Managing Cholesterol  *Education provided by RD: Healthier Eating Out & Snacking; Heart Healthy Shopping & Cooking; Portion Distortion    OTHER CORE COMPONENTS/ RISK FACTORS REASSESSMENT  Medication compliance: good compliance  Using pill box: No  Carries medication list: Yes    Blood Pressure Management:  History of High BP: Yes  Resting BP: 108/60    Tobacco: NEVER  Anyone in the house smoke: No    Initial Knowledge Test Score: 11/15    OTHER CORE COMPONENTS/ RISK FACTOR PLAN   Other Core components/Risk Factor Goals:                                                                     "                                                                                  1. Achieve and maintain a resting blood pressure less than 130/80 while in the program. In progress, patient was having difficulty with elevated BP last month but now demonstrating improvement the last two weeks.  Increased stress at home with 's health.    2. Gain knowledge of cardiac disease and lifestyle modifications related to exercise and ADL's prior to discharge. In progress, pt engaged in education and asks appropriate questions.     Other Components/ Risk Factors Intervention/Education:  *Monitoring HR, BP, dyspnea and arrhythmias each session. Tele showing SR with frequent PACs, PVCs, COUPLETS, bigeminy. Demonstrates stable BP, HR response to exercise. Asymptomatic.   *Meeting 1:1 with patient to discuss goals & progress. Patient feels she is making strong progress and enjoys coming to CR.   *Education provided: Cardiac Anatomy & Physiology; Cardiac Diagnosis & Treatment; Electrical System; Session with the Pharmacist; Stroke Awareness; PAD; Living with Heart Failure & Hands Only CPR; HTN; Angina & NTG Use; Medication Overview & Safety      PSYCHOSOCIAL REASSESSMENT  Patient reported stress level: moderate  Using stress management skills: Yes - young chi, exercise, prayer  HX of anxiety: Yes  HX of depression: Yes - new within the last year due to 's health     Family/Support System: Family, friends, Zoroastrianism (very involved)  Seeing mental health provider: No  Psychosocial medications: Amitriptyline 10mg daily    Initial PHQ-9 score: 2 (no risk)  Was PHQ-9 faxed to provider: No    Quality of Life Survey: SF-36  PCS: 22.47  MCS: 52.55    Stage of change:  Preparation    PSYCHOSOCIAL PLAN  Psychosocial Goals:  1. Improve stage of change to ACTION while in the program. Currently remains in Preparation.   2. To maintain PHQ-9 category classification \"NONE\" while in the program. In progress though met with midway report. " Will administer final discharge PHQ-9 at session #34.     Psychosocial Interventions/ Education:  *Providing one on one emotional support and facilitating peer support within the context of other phase II patients while in the program.   *Pt expresses enjoyment with class structure and connections with fellow patients  *Pt reports increased stress due to 's illness- encouraged to find time for herself.  She does have home health help coming in three days/week for assistance.   *Education provided: Emotional Aspects of Heart Disease Part 1 (Depression & Attitude), Part 2 (Happiness is a Choice), Part 3 (Support Group); Stress Part 1 (Good, Bad, Ugly), Part 2 (Short Circuiting Stress), Part 3 (Mastering Stress)      EXERCISE REASSESSMENT  Home Exercise: Yes  Frequency: one day/weekly   Mode: [facility] at  [employer]     EXERCISE PLAN  Exercise Goals:   1. Goal of 3.9 METs by discharge. MET, currently working at 4.2 METs on recumbent bike.  2. Have a plan in place for continued exercise after the program by discharge. In progress, will discuss within last two weeks of program. Plans to continue with independent exercise at [facility]     Exercise Prescription:   Based on 12 Minute Walk Test  Frequency: 3 days per week  Duration (total aerobic min.): 30 minutes  Intensity RPE: 11-14  Target HR: Rest+30 (99-130bpm)  MET Level Range: 2.3-4.2    Date of first exercise session:     Modality METS Load  Duration   1 Warm Up    05:00   2 Treadmill 2.9 1.8mph 2% 06:00   3 NuStep 2.6 50 Elder  L5 06:00   4 NuStep 2.6 50 Elder L5 06:00   5 Recumbent Bike 4.2 44 Elder L2 06:00   6 Weights  80lbs  06:00   7 Cooldown     05:00     Exercise Intervention:   *Aiming to progress 0.5 MET every 4-5 weeks per reported pain/symptoms/BP   *Introduced resistance training with no issues  *Education provided: Resistance Training Part 1&2; Flexibility; Maintenance Exercise; Intimacy & Heart Disease; Individual MET  Levels; Temperature Extremes & Hydration; Diabetes & Exercise; Benefits of Exercise; Enjoying Exercise; Home Exercise      LEARNING ASSESSMENT & BARRIERS  Readiness to Learn: Eager to learn; asks appropriate questions   Barriers: None    FALL RISK  High   Comments: Does young chi but also hoping to improve balance while in CR    INDIVIDUAL PATIENT GOALS:  1.To improve strength and endurance by discharge. MET, patient reports much improved strength and endurance per improved abilities to perform ADLs and care for ill .   2.To adopt regular, comprehensive home exercise program by discharge. In progress, patient has received home exercise guidelines. Has returned to Young chi at  [facility] and plans to continue independent exercise there upon graduation in February.     MEDICATIONS  Metoprolol 100mg daily; Lisinopril 20mg daily; Levothyroxine 25mcg daily; Pravastatin 40mg daily; Amitriptyline 10mg daily @HS; Hydrochlorothiazide 25mg daily; Centrum Silver vitamin 1 tab daily; Aspirin 81mg daily; Vitamin D3 2000 Units/daily; Nexium 40mg daily        STAFF COMMENTS:   Chelsea has completed 28/36 sessions of phase 2 cardiac rehabilitation, following her recent PCI with stent (JULIANN to LAD) on 10/17/2023. Initially, Katya was only planning to do 12 sessions but has decided to stay for the entire 36 sessions. She remarks that she enjoys coming and finds it to be very beneficial for her.  Thus far, Katya has improved her exercise capacity by 68%, allowing her to work at a MET level range of 2.6-4.2. Telemetry showing SR with very frequent PVCs, PACs and short periods of bigeminy and couplets. Last month, Katya's resting BP readings were elevated, she attributes it to stress at home with 's health.  The last week of sessions, her resting BP is much improved. She reports chronic L hip pain but exercise has not made it worse, nor has it hindered her exercise prescription.  She does have cortisone shot scheduled for that  hip, on 1/31/24.  We have provided Chelsea with education on stress management and emotional aspects of heart disease, as these are areas she needs to put some focus on.  Chelsea will be finished with her CR by mid-February, then plans to continue with independent exercise at [facility].

## 2024-01-31 ENCOUNTER — CLINICAL SUPPORT (OUTPATIENT)
Dept: CARDIAC REHAB | Facility: CLINIC | Age: 79
End: 2024-01-31
Payer: MEDICARE

## 2024-01-31 DIAGNOSIS — Z95.5 STENTED CORONARY ARTERY: ICD-10-CM

## 2024-01-31 PROCEDURE — 93798 PHYS/QHP OP CAR RHAB W/ECG: CPT

## 2024-02-02 ENCOUNTER — CLINICAL SUPPORT (OUTPATIENT)
Dept: CARDIAC REHAB | Facility: CLINIC | Age: 79
End: 2024-02-02
Payer: MEDICARE

## 2024-02-02 DIAGNOSIS — Z95.5 STENTED CORONARY ARTERY: ICD-10-CM

## 2024-02-02 PROCEDURE — 93798 PHYS/QHP OP CAR RHAB W/ECG: CPT

## 2024-02-05 ENCOUNTER — CLINICAL SUPPORT (OUTPATIENT)
Dept: CARDIAC REHAB | Facility: CLINIC | Age: 79
End: 2024-02-05
Payer: MEDICARE

## 2024-02-05 DIAGNOSIS — Z95.5 STENTED CORONARY ARTERY: ICD-10-CM

## 2024-02-05 PROCEDURE — 93798 PHYS/QHP OP CAR RHAB W/ECG: CPT

## 2024-02-07 ENCOUNTER — CLINICAL SUPPORT (OUTPATIENT)
Dept: CARDIAC REHAB | Facility: CLINIC | Age: 79
End: 2024-02-07
Payer: MEDICARE

## 2024-02-07 DIAGNOSIS — Z95.5 STENTED CORONARY ARTERY: ICD-10-CM

## 2024-02-07 PROCEDURE — 93798 PHYS/QHP OP CAR RHAB W/ECG: CPT

## 2024-02-09 ENCOUNTER — CLINICAL SUPPORT (OUTPATIENT)
Dept: CARDIAC REHAB | Facility: CLINIC | Age: 79
End: 2024-02-09
Payer: MEDICARE

## 2024-02-09 ENCOUNTER — TRANSCRIBE ORDERS (OUTPATIENT)
Dept: CARDIAC REHAB | Facility: CLINIC | Age: 79
End: 2024-02-09
Payer: MEDICARE

## 2024-02-09 DIAGNOSIS — Z95.5 STATUS POST CORONARY ARTERY STENT PLACEMENT: ICD-10-CM

## 2024-02-09 DIAGNOSIS — Z95.5 STATUS POST CORONARY ARTERY STENT PLACEMENT: Primary | ICD-10-CM

## 2024-02-09 PROCEDURE — 93798 PHYS/QHP OP CAR RHAB W/ECG: CPT

## 2024-02-12 ENCOUNTER — CLINICAL SUPPORT (OUTPATIENT)
Dept: CARDIAC REHAB | Facility: CLINIC | Age: 79
End: 2024-02-12
Payer: MEDICARE

## 2024-02-12 DIAGNOSIS — Z95.5 STATUS POST CORONARY ARTERY STENT PLACEMENT: ICD-10-CM

## 2024-02-12 PROCEDURE — 93798 PHYS/QHP OP CAR RHAB W/ECG: CPT

## 2024-02-16 ENCOUNTER — CLINICAL SUPPORT (OUTPATIENT)
Dept: CARDIAC REHAB | Facility: CLINIC | Age: 79
End: 2024-02-16
Payer: MEDICARE

## 2024-02-16 DIAGNOSIS — Z95.5 STATUS POST CORONARY ARTERY STENT PLACEMENT: ICD-10-CM

## 2024-02-16 PROCEDURE — 93798 PHYS/QHP OP CAR RHAB W/ECG: CPT

## 2024-02-19 ENCOUNTER — APPOINTMENT (OUTPATIENT)
Dept: CARDIAC REHAB | Facility: CLINIC | Age: 79
End: 2024-02-19
Payer: MEDICARE

## 2024-02-21 NOTE — PROGRESS NOTES
INDIVIDUAL CARDIAC TREATMENT PLAN-DISCHARGE SUMMARY     Name: Chelsea Stinson    Today's Date: 24   : 1945    Primary Provider: THUAN Mancini MD  MRN: [id number]    Referring Physician: TIFFANI Guevara MD     Diagnosis: STENT to LAD   Onset Date: 10/17/23      Risk Stratification: Moderate      NUTRITION DISCHARGE/FOLLOW-UP  Lipids:  Lipid Lab Date: 10/16/23  Total Chol: 110  HDL: 48  LDL: 49  Tri  Cholesterol Med: Pravastatin 40mg daily     Diabetes: No/Denies     Weight Management  Weight: 138lbs  Height: 65 inches  BMI: 23  Pre Body Composition: 34.6%  Post Body Composition: 33.2%  Pre Waist Circumference: 34 inches  Post Waist Circumference: 34 inches   Current Diet: Heart Healthy  Barriers to dietary change: Denies     Initial Dietary Assessment Score: 63/96  Discharge Dietary Assessment Score: 69/96    NUTRITION PLAN  Nutrition Goals:   1. Improve Picture Your Plate assessment results by discharge. Initial score 63/96. MET with improved score of 69/96.  Patient reports she has been working to improve overall nutrition   2. Make changes to diet to include heart healthy options while in the program. In progress. MET as she has improved on goals set by RD, patient has increased use of whole grains as well as increased nuts/heart healthy fats in diet regularly.     Nutrition Intervention/Education  *Performed weekly weight checks on . Patient lost 3lbs fat weight since beginning CR.   *Reviewed follow up Picture Your Plate survey results.    *Education provided: Body Composition/BMI; Individual Cholesterol Levels; Managing Cholesterol  *Education provided by RD: Healthier Eating Out & Snacking; Heart Healthy Shopping & Cooking; Portion Distortion; Focus Areas for Heart Healthy Eating; Sodium, Shaking the Habit; Nutrition Q&A    OTHER CORE COMPONENTS/ RISK FACTORS DISCHARGE/FOLLOW-UP  Medication compliance: good compliance  Using pill box: No  Carries medication list: Yes    Blood Pressure  Management:  History of High BP: Yes  Resting BP: 110/64    Tobacco: NEVER  Anyone in the house smoke: No    Initial Knowledge Test Score: 11/15  Discharge Knowledge Test Score: 15/15    OTHER CORE COMPONENTS/ RISK FACTOR PLAN   Other Core components/Risk Factor Goals:                                                                                                                                                      1. Achieve and maintain a resting blood pressure less than 130/80 while in the program. MET more consistently at end of program, stress levels vary with 's current health status.   2. Gain knowledge of cardiac disease and lifestyle modifications related to exercise and ADL's prior to discharge. MET, demonstrated by improved score on discharge knowledge test.  Patient very engaged in education and asked appropriate questions.      Other Components/ Risk Factors Intervention/Education:  *Monitored HR, BP, dyspnea and arrhythmias each session. Tele showed SR-ST with frequent PACs, PVCs, COUPLETS, bigeminy. Demonstrated stable BP, HR response to exercise. Asymptomatic.   *Met 1:1 with patient to discuss goals & progress. Patient reported she made strong progress and enjoyed coming to CR. Patient found it to be very beneficial in her recovery.   *Education provided: Cardiac Anatomy & Physiology; Cardiac Diagnosis & Treatment; Electrical System; Session with the Pharmacist; Stroke Awareness; PAD; Living with Heart Failure & Hands Only CPR; HTN; Angina & NTG Use; Medication Overview & Safety      PSYCHOSOCIAL DISCHARGE/FOLLOW-UP  Patient reported stress level: moderate  Using stress management skills: Yes - young chi, exercise, prayer  HX of anxiety: Yes  HX of depression: Yes - new within the last year due to 's health     Family/Support System: Family, friends, Hinduism (very involved)  Seeing mental health provider: No  Psychosocial medications: Amitriptyline 10mg daily    Initial PHQ-9 score: 2 (no  "risk)  Discharge PHQ-9 score: 1 (no risk)  Was PHQ-9 faxed to provider: No    Quality of Life Survey: SF-36  Pre PCS: 22.47  Post PCS: 25.67 (^14%)    Pre MCS: 52.55  Post MCS: 58.84 (^12%)    Stage of change:  Action    PSYCHOSOCIAL PLAN  Psychosocial Goals:  1. Improve stage of change to ACTION while in the program. MET as demonstrated with improvements made in all areas of heart healthy living.   2. To maintain PHQ-9 category classification \"NONE\" while in the program. MET with discharge score of \"1\". Patient reports no current symptoms of depression.     Psychosocial Interventions/ Education:  *Provided one on one emotional support and facilitating peer support within the context of other phase II patients while in the program.   *Pt expressed enjoyment with class structure and connections with fellow patients  *Pt reported increased stress due to 's illness- encouraged to find time for herself.  She does have home health help coming in three days/week for assistance.   *Education provided: Emotional Aspects of Heart Disease Part 1 (Depression & Attitude), Part 2 (Happiness is a Choice), Part 3 (Support Group); Stress Part 1 (Good, Bad, Ugly), Part 2 (Short Circuiting Stress), Part 3 (Mastering Stress)      EXERCISE DISCHARGE/FOLLOW-UP  Home Exercise: Yes  Frequency: one day/weekly   Mode: [facility] at  [email or web address]     EXERCISE PLAN  Exercise Goals:   1. Goal of 3.9 METs by discharge. MET, currently working at 4.2 METs on recumbent bike.  2. Have a plan in place for continued exercise after the program by discharge. MET.  Plans to continue with independent exercise at [email or web address]     Exercise Prescription:   Based on 12 Minute Walk Test  Frequency: 3 days per week  Duration (total aerobic min.): 30 minutes  Intensity RPE: 11-14  Target HR: Rest+30 (99-130bpm)  MET Level Range: 2.3-4.2    Date of first exercise session: 11/8/2023     Modality METS Load  Duration   1 Warm Up    05:00   2 " Treadmill 3.4 2mph 3% 06:00   3 NuStep 2.6 50 Elder  L5 06:00   4 NuStep 2.6 50 Elder L5 06:00   5 Recumbent Bike 4.2 44 Elder L2 06:00   6 Weights  80lbs  06:00   7 Cooldown     05:00     Exercise Intervention:   *Reviewed discharge activity guidelines and outcomes measures   *Education provided: Resistance Training Part 1&2; Flexibility; Maintenance Exercise; Intimacy & Heart Disease; Individual MET Levels; Temperature Extremes & Hydration; Diabetes & Exercise; Benefits of Exercise; Enjoying Exercise; Home Exercise      LEARNING ASSESSMENT & BARRIERS  Readiness to Learn: Eager to learn; asks appropriate questions   Barriers: None    FALL RISK  High   Comments: Does young chi but also hoping to improve balance while in CR    INDIVIDUAL PATIENT GOALS:  1.To improve strength and endurance by discharge. MET, patient reports much improved strength and endurance per improved abilities to perform ADLs and care for ill .   2.To adopt regular, comprehensive home exercise program by discharge. MET. Has returned to Young chi at [facility] and plans to continue independent exercise at this facility.     MEDICATIONS  Metoprolol 100mg daily; Lisinopril 20mg daily; Levothyroxine 25mcg daily; Pravastatin 40mg daily; Amitriptyline 10mg daily @HS; Hydrochlorothiazide 25mg daily; Centrum Silver vitamin 1 tab daily; Aspirin 81mg daily; Vitamin D3 2000 Units/daily; Nexium 40mg daily        STAFF COMMENTS:   Chelsea completed 36/36 sessions of phase 2 cardiac rehabilitation, following her recent PCI with stent (JULIANN to LAD) on 10/17/2023. She remarks that she enjoyed coming and found it to be very beneficial for her.  Because of her participation, she feels confident with moving onto independent exercise safely.  Overall, Katya improved her exercise capacity by 68%, allowing her to work at a MET level range of 2.6-4.2. Telemetry showed SR with very frequent PVCs, PACs and short periods of bigeminy and couplets. Last month, Katya's  resting BP readings were elevated, she attributes it to stress at home with 's health.  Over the last few weeks, her resting BP is much improved. She reported chronic L hip pain but exercise did not made it worse, nor did it hindered her exercise prescription.  Katya has a strong support system as well as her nadine and feels she uses her exercise as a great stress relief.  She plans to continue with exercise at the [facility], where she has been a member for many years. Thank you for allowing us to care for Katya in her recovery.

## 2025-01-08 NOTE — PROGRESS NOTES
PCP: Marry Mancini MD         CHIEF COMPLAINT: vaginal/vulvar sore         HISTORY OF PRESENT ILLNESS:  This is a  79 y.o. female who presents with vaginal/vulvar sore. She was previously seen by our office on [10/7/2015].     Vaginal atrophy  Hx of prolapse  S/p total hysterectomy [10/7/15] with Hijaz    Burning, red area in vaginal area, since September. Had hip replaced in October. Has seen primary care, Dr. Mancini who prescribed Vagifem, took entire prescription that was given to her and it made no difference, not currently taking. On the inside of her left labia, itching, burning, painful when she urinates, a little bit of yellow drainage that she sees on her pads. Also tried premarin cream, but didn't try for very long, thinks she might have used a steroid previously as well.    Denies urinary frequency, urgency, dysuria. No urinary incontinence. No prolapse symptoms. No bowel issues. Not sexually active. No HRT.          PHYSICAL EXAMINATION:  No LMP recorded.  Body mass index is 22.63 kg/m².  ,  Vitals:    01/09/25 1332   BP: 148/81   Pulse: 77   Temp: 36.7 °C (98.1 °F)       General Appearance: well appearing  Neuro: Alert and oriented     Pelvic:  Genitourinary: see clinical image below  Vaginal mucosa: no ulcerations or lesions extending from labia into the vagina on speculum exam  Cervix: surgically absent  Uterus: surgically absent  Atrophy: positive        POP-Q (in supine position):       Aa: 0     Ba: 0     C: -5              Gh: 3.5     Pb: 3     TVL: 7              Ap: -1     Bp: -1     D: x    Rectal: deferred  Urine dip: No results found for this or any previous visit (from the past 24 hours).      Patient ID: Chelsea Stinson is a 79 y.o. female.  Biopsy vulva    Date/Time: 1/9/2025 3:30 PM    Performed by: Emelina Granado MD  Authorized by: Emelina Granado MD    Procedure Overview:     Procedure type: punch biopsy of skin      # of biopsies taken:  2  Consent:     Consent obtained:   Verbal    Consent given by:  Patient    Risks & benefits discussed with patient: Yes    Universal Protocol:     Required blood products, implants, devices, and special equipment available: yes      Immediately prior to procedure a time out was called: yes      Patient identity confirmed:  Verbally with patient  Indication:     Indications: lesion and pigmentation change    Pre-procedure Details:     Premeds:  None    Prepped with: betadine      Local anesthetic:  Bupivacaine 0.5% w/o epi    Total amount used (mL):  5  Procedure Details:     Location 1: labia majora (R)      Size (mm):  5    Hemostasis: pressure and suture      Location 2: labia majora (L)      Size (mm):  5    Hemostasis: pressure and suture    Post-procedure Details:     Patient tolerance of procedure:  Tolerated well, no immediate complications    Specimen sent to pathology: Yes      In relation to vaginal introitus, biopsies taken at 1 o'clock and 9 o'clock of vulva    IMPRESSION AND PLAN:  Chelsea Stinson is a 79 y.o. who presents with vulvar irritation and bilateral vulvar lesions, suspected lichen sclerosis.    - Biopsy of bilateral vulva today, will contact with results  - Will consider topical clobetasol once biopsy sites have healed.    Follow up in 2 weeks for pathology review.     Patient evaluated and plan discussed with Dr. Hamilton.    Emelina Granado MD, SOSA, BSN  URPS Fellow, PGY-5    I saw and evaluated the patient. I personally obtained the key and critical portions of the history and physical exam or was physically present for key and critical portions performed by the resident/fellow. I reviewed the resident/fellow's documentation and discussed the patient with the resident/fellow. I agree with the resident/fellow's medical decision making as documented in the note.   Denis Hamilton MD

## 2025-01-09 ENCOUNTER — OFFICE VISIT (OUTPATIENT)
Dept: UROLOGY | Facility: CLINIC | Age: 80
End: 2025-01-09
Payer: MEDICARE

## 2025-01-09 VITALS
TEMPERATURE: 98.1 F | DIASTOLIC BLOOD PRESSURE: 81 MMHG | BODY MASS INDEX: 22.63 KG/M2 | HEART RATE: 77 BPM | SYSTOLIC BLOOD PRESSURE: 148 MMHG | WEIGHT: 136 LBS

## 2025-01-09 DIAGNOSIS — N90.89 VULVAR LESION: Primary | ICD-10-CM

## 2025-01-09 DIAGNOSIS — R32 URINARY INCONTINENCE, UNSPECIFIED TYPE: ICD-10-CM

## 2025-01-09 PROCEDURE — 0753T DGTZ GLS MCRSCP SLD LEVEL IV: CPT

## 2025-01-09 PROCEDURE — 88305 TISSUE EXAM BY PATHOLOGIST: CPT

## 2025-01-09 PROCEDURE — 88305 TISSUE EXAM BY PATHOLOGIST: CPT | Performed by: STUDENT IN AN ORGANIZED HEALTH CARE EDUCATION/TRAINING PROGRAM

## 2025-01-09 RX ORDER — ESTRADIOL 10 UG/1
INSERT VAGINAL
COMMUNITY
Start: 2024-09-03

## 2025-01-09 ASSESSMENT — ENCOUNTER SYMPTOMS
OCCASIONAL FEELINGS OF UNSTEADINESS: 0
LOSS OF SENSATION IN FEET: 0
DEPRESSION: 0

## 2025-01-15 LAB
LABORATORY COMMENT REPORT: NORMAL
PATH REPORT.COMMENTS IMP SPEC: NORMAL
PATH REPORT.FINAL DX SPEC: NORMAL
PATH REPORT.GROSS SPEC: NORMAL
PATH REPORT.RELEVANT HX SPEC: NORMAL
PATH REPORT.TOTAL CANCER: NORMAL

## 2025-01-20 DIAGNOSIS — Z11.3 SCREEN FOR STD (SEXUALLY TRANSMITTED DISEASE): ICD-10-CM

## 2025-01-23 ENCOUNTER — LAB (OUTPATIENT)
Dept: LAB | Facility: LAB | Age: 80
End: 2025-01-23
Payer: MEDICARE

## 2025-01-23 DIAGNOSIS — Z11.3 SCREEN FOR STD (SEXUALLY TRANSMITTED DISEASE): ICD-10-CM

## 2025-01-23 PROCEDURE — 86780 TREPONEMA PALLIDUM: CPT

## 2025-01-24 LAB — TREPONEMA PALLIDUM IGG+IGM AB [PRESENCE] IN SERUM OR PLASMA BY IMMUNOASSAY: NONREACTIVE

## 2025-01-31 ENCOUNTER — APPOINTMENT (OUTPATIENT)
Dept: UROLOGY | Facility: CLINIC | Age: 80
End: 2025-01-31
Payer: MEDICARE

## 2025-01-31 VITALS
DIASTOLIC BLOOD PRESSURE: 69 MMHG | BODY MASS INDEX: 23.09 KG/M2 | SYSTOLIC BLOOD PRESSURE: 165 MMHG | WEIGHT: 138.6 LBS | HEART RATE: 43 BPM | HEIGHT: 65 IN

## 2025-01-31 DIAGNOSIS — L90.0 LICHEN SCLEROSUS: ICD-10-CM

## 2025-01-31 DIAGNOSIS — N90.89 VULVAR LESION: ICD-10-CM

## 2025-01-31 PROCEDURE — 99214 OFFICE O/P EST MOD 30 MIN: CPT | Performed by: OBSTETRICS & GYNECOLOGY

## 2025-01-31 PROCEDURE — 1157F ADVNC CARE PLAN IN RCRD: CPT | Performed by: OBSTETRICS & GYNECOLOGY

## 2025-01-31 PROCEDURE — 1159F MED LIST DOCD IN RCRD: CPT | Performed by: OBSTETRICS & GYNECOLOGY

## 2025-01-31 PROCEDURE — 3078F DIAST BP <80 MM HG: CPT | Performed by: OBSTETRICS & GYNECOLOGY

## 2025-01-31 PROCEDURE — 3077F SYST BP >= 140 MM HG: CPT | Performed by: OBSTETRICS & GYNECOLOGY

## 2025-01-31 PROCEDURE — G2211 COMPLEX E/M VISIT ADD ON: HCPCS | Performed by: OBSTETRICS & GYNECOLOGY

## 2025-01-31 RX ORDER — CLOBETASOL PROPIONATE 0.5 MG/G
OINTMENT TOPICAL
Qty: 45 G | Refills: 3 | Status: SHIPPED | OUTPATIENT
Start: 2025-01-31

## 2025-01-31 RX ORDER — ESTRADIOL 0.1 MG/G
CREAM VAGINAL
Qty: 42.5 G | Refills: 3 | Status: SHIPPED | OUTPATIENT
Start: 2025-01-31

## 2025-01-31 NOTE — PROGRESS NOTES
INITIAL EVALUATION       HISTORY OF PRESENT ILLNESS:   Chelsea Stinson is a 79 y.o. female who presents to me today for evaluation of vulvar lesion, s/p vulvar biopsy on 1/9/25, referred to me by Denis Hamilton MD. Patient has had a burning, red area in vaginal area, since September. Had hip replaced in October. Has seen primary care, Dr. Mancini who prescribed Vagifem, took entire prescription that was given to her and it made no difference. On the inside of her left labia, she experiences itching, burning, dysuria, a little bit of yellow drainage that she sees on her pads. Also tried premarin cream, but did not try for very long, thinks she might have used a steroid previously as well. Denies urinary frequency, urgency, dysuria. No urinary incontinence. No prolapse symptoms. No bowel issues. Not sexually active. No HRT.     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 Syphilis testing was completed on 1/23/25 which demonstrated no significant level of Treponema pallidum antibody detected.     PAST MEDICAL HISTORY:  Past Medical History:   Diagnosis Date    Other conditions influencing health status     Coronary Artery Disease       PAST SURGICAL HISTORY:  Past Surgical History:   Procedure Laterality Date    CARDIAC CATHETERIZATION N/A 10/17/2023    Procedure: Left Heart Cath;  Surgeon: Monica Sprague MD;  Location: [facility], [place] Cardiac Cath Lab;  Service: Cardiovascular;  Laterality: N/A;    CARDIAC CATHETERIZATION N/A 10/17/2023    Procedure: PCI JULIANN Stent- Coronary;  Surgeon: Monica Sprague MD;  Location: [facility], [place] Cardiac Cath Lab;  Service: Cardiovascular;  Laterality: N/A;    CORONARY ANGIOPLASTY WITH STENT PLACEMENT  08/22/2013    Cath Stent Placement    LITHOTRIPSY  07/27/2015    Renal Lithotripsy    TUBAL LIGATION  07/27/2015    Tubal Ligation        ALLERGIES:   No Known Allergies     MEDICATIONS:   Medication Documentation Review Audit       Reviewed by Marisol  MARISA Jose (Medical Assistant) on 01/31/25 at 0858      Medication Order Taking? Sig Documenting Provider Last Dose Status   amitriptyline (Elavil) 10 mg tablet [id number] Yes Take 1 tablet (10 mg) by mouth once daily at bedtime. Historical MD Samara  Active   aspirin 81 mg EC tablet [id number] Yes Take 1 tablet (81 mg) by mouth once daily. Historical Provider, MD  Active   baclofen (Lioresal) 10 mg tablet [id number] Yes Take 1 tablet (10 mg) by mouth 2 times a day. Historical Provider, MD  Active   cholecalciferol (Vitamin D-3) 25 MCG (1000 UT) tablet [id number] Yes Take 1 tablet (25 mcg) by mouth once daily. Historical Provider, MD  Active   estradiol (Vagifem) 10 mcg tablet vaginal tablet [id number] Yes  Historical Provider, MD  Active   fluticasone (Flonase) 50 mcg/actuation nasal spray [id number] Yes Administer 1 spray into each nostril 2 times a day as needed. Historical Provider, MD  Active   gabapentin (Neurontin) 100 mg capsule [id number] Yes Take by mouth 3 times a day. Historical Provider, MD  Active   hydroCHLOROthiazide (HYDRODiuril) 25 mg tablet [id number] Yes Take 0.5 tablets (12.5 mg) by mouth once daily. Historical Provider, MD  Active   levothyroxine (Synthroid, Levoxyl) 25 mcg tablet [id number] Yes Take 1 tablet (25 mcg) by mouth once daily in the morning. Take before meals. Historical Provider, MD  Active   lisinopril 20 mg tablet [id number] Yes Take 1 tablet (20 mg) by mouth once daily. Historical Provider, MD  Active   magnesium sulfate 100 mg capsule [id number] Yes Take 1 capsule by mouth once daily. Historical Provider, MD  Active   metoprolol succinate XL (Toprol-XL) 100 mg 24 hr tablet [id number] Yes Take 1.5 tablets (150 mg) by mouth once daily. Do not crush or chew. Historical Provider, MD  Active   nitroglycerin (Nitrostat) 0.4 mg SL tablet [id number] Yes Place 1 tablet (0.4 mg) under the tongue every 5 minutes if needed for chest pain. Historical Provider, MD  Active   rosuvastatin (Crestor) 20  mg tablet [id number] Yes Take 1 tablet (20 mg) by mouth once daily. Historical Provider, MD  Active                     SOCIAL HISTORY:  Patient  reports that she has never smoked. She has never been exposed to tobacco smoke. She has never used smokeless tobacco.   Social History     Socioeconomic History    Marital status:      Spouse name: Not on file    Number of children: Not on file    Years of education: Not on file    Highest education level: Not on file   Occupational History    Not on file   Tobacco Use    Smoking status: Never     Passive exposure: Never    Smokeless tobacco: Never   Substance and Sexual Activity    Alcohol use: Not on file    Drug use: Not on file    Sexual activity: Not on file   Other Topics Concern    Not on file   Social History Narrative    Not on file     Social Drivers of Health     Financial Resource Strain: Low Risk  (10/16/2023)    Overall Financial Resource Strain (CARDIA)     Difficulty of Paying Living Expenses: Not hard at all   Food Insecurity: No Food Insecurity (11/8/2024)    Received from [facility]    Hunger Vital Sign     Worried About Running Out of Food in the Last Year: Never true     Ran Out of Food in the Last Year: Never true   Transportation Needs: Unmet Transportation Needs (11/8/2024)    Received from [facility]    PRAPARE - Transportation     Lack of Transportation (Medical): Yes     Lack of Transportation (Non-Medical): Yes   Physical Activity: Not on file   Stress: Not on file   Social Connections: Not on file   Intimate Partner Violence: Not on file   Housing Stability: High Risk (11/8/2024)    Received from [facility]    Housing Stability Vital Sign     Unable to Pay for Housing in the Last Year: Yes     Number of Times Moved in the Last Year: Not on file     Homeless in the Last Year: Yes       FAMILY HISTORY:  Family History   Problem Relation Name Age of Onset    Other (cardiac disorder) Mother      Other (cardiac disorder)  "Father      Hypertension Father         REVIEW OF SYSTEMS:  Constitutional: Negative for fever and chills. Denies anorexia, weight loss.  Eyes: Negative for visual disturbance.   Respiratory: Negative for shortness of breath.    Cardiovascular: Negative for chest pain.   Gastrointestinal: Negative for nausea and vomiting.   Genitourinary: See interval history above.  Skin: Negative for rash.   Neurological: Negative for dizziness and numbness.   Psychiatric/Behavioral: Negative for confusion and decreased concentration.     PHYSICAL EXAM:  Blood pressure 165/69, pulse (!) 43, height 1.651 m (5' 5\"), weight 62.9 kg (138 lb 9.6 oz).  Constitutional: Patient appears well-developed and well-nourished. No distress.    Head: Normocephalic and atraumatic.    Neck: Normal range of motion.    Cardiovascular: Normal rate.    Pulmonary/Chest: Effort normal. No respiratory distress.   : no ulcerations or lesions extending from labia into the vagina   Musculoskeletal: Normal range of motion.    Neurological: Alert and oriented to person, place, and time.  Psychiatric: Normal mood and affect. Behavior is normal. Thought content normal.      PATHOLOGY REVIEW:  FINAL DIAGNOSIS   A. Vulva, left, biopsy:  - Dense plasma cell infiltrate. See note and comment.  Note: Differential diagnoses include plasma cell vulvitis (favored) and secondary syphilis.     B. Vulva, right, biopsy:  - Lichenoid tissue reaction. See note and comment.   Note: Differential diagnoses includes early lichen sclerosus and in the right clinical context, lichen planus.      Assessment:      1. Vulvar lesion        2. Lichen sclerosus            Chelsea Stinson is a 79 y.o. 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       Remove all irritants or allergens and stop all soaps, detergents, douches, etc     Sitz bath in warm water 1 x/day for 10-15 minutes     High potency topical corticosteroid ointment (clobetasol 0.05%) rub in 60-90 seconds; reduce to 1-2 x per " week or decrease potency when all active LS has resolved, not when asymptomatic, this will be long-term treatment     For persistent narrowing of introitus, phimosis, tearing at posterior fourchette=resolve active disease, treat with topical estradiol, PFMPT/dilators; if necessary in office LUNA or surgery     Plan:   Prescription for clobetasol ointment 0.05% sent to the patient's pharmacy.    Prescription for estradiol 0.01% vaginal cream sent to the patient's pharmacy.    Patient will use clobetasol ointment twice a day for 6 weeks followed by once a day for 6 weeks.    Follow-up with Jillian Flowers in 3 months.     Jackie Mukherjee MD MPH      Scribe Attestation  By signing my name below, I, Dony Marie   attest that this documentation has been prepared under the direction and in the presence of Jackie Mukherjee MD MPH.

## 2025-02-06 ENCOUNTER — APPOINTMENT (OUTPATIENT)
Dept: UROLOGY | Facility: CLINIC | Age: 80
End: 2025-02-06
Payer: MEDICARE

## 2025-02-06 VITALS
WEIGHT: 138 LBS | DIASTOLIC BLOOD PRESSURE: 88 MMHG | BODY MASS INDEX: 22.99 KG/M2 | HEIGHT: 65 IN | SYSTOLIC BLOOD PRESSURE: 145 MMHG | HEART RATE: 70 BPM

## 2025-02-06 DIAGNOSIS — L90.0 LICHEN SCLEROSUS: ICD-10-CM

## 2025-02-06 DIAGNOSIS — N95.2 VAGINAL ATROPHY: Primary | ICD-10-CM

## 2025-02-06 PROCEDURE — 1157F ADVNC CARE PLAN IN RCRD: CPT | Performed by: UROLOGY

## 2025-02-06 PROCEDURE — 3079F DIAST BP 80-89 MM HG: CPT | Performed by: UROLOGY

## 2025-02-06 PROCEDURE — 1159F MED LIST DOCD IN RCRD: CPT | Performed by: UROLOGY

## 2025-02-06 PROCEDURE — 1036F TOBACCO NON-USER: CPT | Performed by: UROLOGY

## 2025-02-06 PROCEDURE — 3077F SYST BP >= 140 MM HG: CPT | Performed by: UROLOGY

## 2025-02-06 PROCEDURE — 99213 OFFICE O/P EST LOW 20 MIN: CPT | Performed by: UROLOGY

## 2025-02-06 NOTE — PROGRESS NOTES
"  Patient ID: Chelsea Stinson is a 79 y.o. female who presents for follow up of vulvar biopsy found to have lichen sclerosus    Vaginal atrophy  Hx of prolapse  S/p total hysterectomy [10/7/15] with Joy    She was seen by Dr. Mukherjee 1/31/2025, per her note: She recommended removing all irritants or allergens and stop all soaps, detergents, douches, etc Sitz bath in warm water 1 x/day for 10-15 minutes High potency topical corticosteroid ointment (clobetasol 0.05%) rub in 60-90 seconds; reduce to 1-2 x per week or decrease potency when all active LS has resolved, not when asymptomatic, this will be long-term treatment For persistent narrowing of introitus, phimosis, tearing at posterior fourchette=resolve active disease, treat with topical estradiol, PFMPT/dilators; if necessary in office LUNA or surgery    Patient notes since starting clobetasol last week her dysuria has improved but thinks the vulvar area looks the same. Just picked up estradiol cream yesterday.      PHYSICAL EXAMINATION:  No LMP recorded.  Body mass index is 22.96 kg/m².  /88   Pulse 70   Ht 1.651 m (5' 5\")   Wt 62.6 kg (138 lb)   BMI 22.96 kg/m²   General Appearance: well appearing  Neuro: Alert and oriented   HEENT: mucous membranes moist, neck supple  Resp: No respiratory distress, normal work of breathing  MSK: normal range of motion, gait appropriate  Pelvic: deferred    Imaging and results:  PATHOLOGY REVIEW:  FINAL DIAGNOSIS   A. Vulva, left, biopsy:  - Dense plasma cell infiltrate. See note and comment.  Note: Differential diagnoses include plasma cell vulvitis (favored) and secondary syphilis.     B. Vulva, right, biopsy:  - Lichenoid tissue reaction. See note and comment.   Note: Differential diagnoses includes early lichen sclerosus and in the right clinical context, lichen planus.     Assessment/Plan     Chelsea Stinson is a 79 y.o.  with vulvar irritation and bilateral vulvar lesions, suspected lichen sclerosis. S/p 1/9/2025 " vulvar biopsy. Presenting tor review results.    Discussed benign findings as previously discussed with Dr Mukherjee on 1/31/25.   Written instructions provided on estradiol and clobetasol prescriptions with instructions for application. All questions answered.     Follow up with Jillian Flowers NP in 3 months     Ayesha Tejada MD  URPS Fellow    I saw and evaluated the patient. I personally obtained the key and critical portions of the history and physical exam or was physically present for key and critical portions performed by the resident/fellow. I reviewed the resident/fellow's documentation and discussed the patient with the resident/fellow. I agree with the resident/fellow's medical decision making as documented in the note.   Denis Hamilton MD

## 2025-05-05 ENCOUNTER — APPOINTMENT (OUTPATIENT)
Dept: UROLOGY | Facility: CLINIC | Age: 80
End: 2025-05-05
Payer: MEDICARE

## 2025-05-05 VITALS
HEART RATE: 58 BPM | HEIGHT: 65 IN | SYSTOLIC BLOOD PRESSURE: 180 MMHG | BODY MASS INDEX: 22.49 KG/M2 | TEMPERATURE: 97.7 F | WEIGHT: 135 LBS | DIASTOLIC BLOOD PRESSURE: 91 MMHG

## 2025-05-05 DIAGNOSIS — N95.8 GENITOURINARY SYNDROME OF MENOPAUSE: ICD-10-CM

## 2025-05-05 DIAGNOSIS — L90.0 LICHEN SCLEROSUS: Primary | ICD-10-CM

## 2025-05-05 PROCEDURE — 99213 OFFICE O/P EST LOW 20 MIN: CPT | Performed by: NURSE PRACTITIONER

## 2025-05-05 PROCEDURE — 1159F MED LIST DOCD IN RCRD: CPT | Performed by: NURSE PRACTITIONER

## 2025-05-05 PROCEDURE — 3077F SYST BP >= 140 MM HG: CPT | Performed by: NURSE PRACTITIONER

## 2025-05-05 PROCEDURE — 3080F DIAST BP >= 90 MM HG: CPT | Performed by: NURSE PRACTITIONER

## 2025-05-05 PROCEDURE — G2211 COMPLEX E/M VISIT ADD ON: HCPCS | Performed by: NURSE PRACTITIONER

## 2025-05-05 PROCEDURE — 1157F ADVNC CARE PLAN IN RCRD: CPT | Performed by: NURSE PRACTITIONER

## 2025-05-05 PROCEDURE — 1036F TOBACCO NON-USER: CPT | Performed by: NURSE PRACTITIONER

## 2025-05-05 ASSESSMENT — PATIENT HEALTH QUESTIONNAIRE - PHQ9
1. LITTLE INTEREST OR PLEASURE IN DOING THINGS: SEVERAL DAYS
SUM OF ALL RESPONSES TO PHQ9 QUESTIONS 1 AND 2: 2
2. FEELING DOWN, DEPRESSED OR HOPELESS: SEVERAL DAYS
10. IF YOU CHECKED OFF ANY PROBLEMS, HOW DIFFICULT HAVE THESE PROBLEMS MADE IT FOR YOU TO DO YOUR WORK, TAKE CARE OF THINGS AT HOME, OR GET ALONG WITH OTHER PEOPLE: SOMEWHAT DIFFICULT

## 2025-05-05 NOTE — PATIENT INSTRUCTIONS
Clobetasol ointment  Apply twice daily x 4 weeks, then daily x 2 weeks, then 2-3 x per week  To labia of vulva bilaterally (white and red tissue)    Estrogen vaginal cream  Apply 1 gram 2-3 x per week at urethra inside vaginal opening    Rub all creams/ointments 60-90 seconds with application    Follow up 6-8 weeks vulvar exam  Nurse line [phone]

## 2025-05-05 NOTE — PROGRESS NOTES
"  Patient ID: Chelsea Stinson is a 80 y.o. female who presents for follow  lichen sclerosus. Not sure how often to use cream/ointments, brought notes but needed clarification; clobetasol helps with dysuria; right now using both once daily (estrogen and clobetasol) but not rubbing into tissue; would like to look at vulva with mirror to better understand where to apply; (used mirror with exam and showed her anatomy and areas to apply medicine).    Imported from Dr. Hamilton last notes on 2/6/25    Vaginal atrophy  Hx of prolapse  S/p total hysterectomy [10/7/15] with Joy    She was seen by Dr. Mukherjee 1/31/2025, per her note: She recommended removing all irritants or allergens and stop all soaps, detergents, douches, etc Sitz bath in warm water 1 x/day for 10-15 minutes High potency topical corticosteroid ointment (clobetasol 0.05%) rub in 60-90 seconds; reduce to 1-2 x per week or decrease potency when all active LS has resolved, not when asymptomatic, this will be long-term treatment For persistent narrowing of introitus, phimosis, tearing at posterior fourchette=resolve active disease, treat with topical estradiol, PFMPT/dilators; if necessary in office LUNA or surgery    Patient notes since starting clobetasol last week her dysuria has improved but thinks the vulvar area looks the same. Just picked up estradiol cream yesterday.      PHYSICAL EXAMINATION:  No LMP recorded.  Body mass index is 22.47 kg/m².  BP (!) 180/91   Pulse 58   Temp 36.5 °C (97.7 °F)   Ht 1.651 m (5' 5\")   Wt 61.2 kg (135 lb)   BMI 22.47 kg/m²   General Appearance: well appearing  Neuro: Alert and oriented   HEENT: mucous membranes moist, neck supple  Resp: No respiratory distress, normal work of breathing  MSK: normal range of motion, gait appropriate  Pelvic: vulva with erythema and white tissue c/w LS bilaterally, atrophy, noted     Imaging and results:  PATHOLOGY REVIEW:  FINAL DIAGNOSIS   A. Vulva, left, biopsy:  - Dense plasma cell " infiltrate. See note and comment.  Note: Differential diagnoses include plasma cell vulvitis (favored) and secondary syphilis.     B. Vulva, right, biopsy:  - Lichenoid tissue reaction. See note and comment.   Note: Differential diagnoses includes early lichen sclerosus and in the right clinical context, lichen planus.     Assessment/Plan     Chelsea Stinson is a 79 y.o.  with vulvar irritation and bilateral vulvar lesions, S/p 1/9/2025 vulvar biopsy confirmed lichen sclerosis.     Clobetasol ointment  Apply twice daily x 4 weeks, then daily x 2 weeks, then 2-3 x per week  To labia of vulva bilaterally (white and red tissue)    Estrogen vaginal cream  Apply 1 gram 2-3 x per week at urethra inside vaginal opening    Rub all creams/ointments 60-90 seconds with application    Follow up 6-8 weeks vulvar exam  Nurse line [phone]      Jillian Flowers, APRN-CNP

## 2025-06-30 ENCOUNTER — APPOINTMENT (OUTPATIENT)
Dept: UROLOGY | Facility: CLINIC | Age: 80
End: 2025-06-30
Payer: MEDICARE

## 2025-07-01 ENCOUNTER — APPOINTMENT (OUTPATIENT)
Dept: UROLOGY | Facility: CLINIC | Age: 80
End: 2025-07-01
Payer: MEDICARE

## 2025-07-01 VITALS — TEMPERATURE: 97.9 F | DIASTOLIC BLOOD PRESSURE: 71 MMHG | SYSTOLIC BLOOD PRESSURE: 167 MMHG | HEART RATE: 54 BPM

## 2025-07-01 DIAGNOSIS — L90.0 LICHEN SCLEROSUS: ICD-10-CM

## 2025-07-01 DIAGNOSIS — N90.89 VULVAR LESION: ICD-10-CM

## 2025-07-01 DIAGNOSIS — R32 URINARY INCONTINENCE, UNSPECIFIED TYPE: ICD-10-CM

## 2025-07-01 LAB
POC APPEARANCE, URINE: ABNORMAL
POC BILIRUBIN, URINE: NEGATIVE
POC BLOOD, URINE: ABNORMAL
POC COLOR, URINE: ABNORMAL
POC GLUCOSE, URINE: NEGATIVE MG/DL
POC KETONES, URINE: NEGATIVE MG/DL
POC LEUKOCYTES, URINE: ABNORMAL
POC NITRITE,URINE: NEGATIVE
POC PH, URINE: 5.5 PH
POC PROTEIN, URINE: NEGATIVE MG/DL
POC SPECIFIC GRAVITY, URINE: 1.02
POC UROBILINOGEN, URINE: 0.2 EU/DL

## 2025-07-01 PROCEDURE — 1159F MED LIST DOCD IN RCRD: CPT | Performed by: NURSE PRACTITIONER

## 2025-07-01 PROCEDURE — G2211 COMPLEX E/M VISIT ADD ON: HCPCS | Performed by: NURSE PRACTITIONER

## 2025-07-01 PROCEDURE — 3077F SYST BP >= 140 MM HG: CPT | Performed by: NURSE PRACTITIONER

## 2025-07-01 PROCEDURE — 99213 OFFICE O/P EST LOW 20 MIN: CPT | Performed by: NURSE PRACTITIONER

## 2025-07-01 PROCEDURE — 81003 URINALYSIS AUTO W/O SCOPE: CPT | Performed by: NURSE PRACTITIONER

## 2025-07-01 PROCEDURE — 3078F DIAST BP <80 MM HG: CPT | Performed by: NURSE PRACTITIONER

## 2025-07-01 RX ORDER — CLOBETASOL PROPIONATE 0.5 MG/G
OINTMENT TOPICAL
Qty: 60 G | Refills: 3 | Status: SHIPPED | OUTPATIENT
Start: 2025-07-01

## 2025-07-01 NOTE — PROGRESS NOTES
Patient ID: Chelsea Stinson is a 80 y.o. female who presents for follow  up lichen sclerosus, last seen 5/5/25;     Today Vulva appears considerably better from photo imaging in the chart on 1/9/25; no longer bright red erythema has < 1 cm area light red/brown area, neg biopsy in January; burning and irriration has cleared up, now on clobetasol daily and estrogen 3 x per week    Imported from Dr. Hamilton last notes on 2/6/25    Vaginal atrophy  Hx of prolapse  S/p total hysterectomy [10/7/15] with Joy    She was seen by Dr. Mukherjee 1/31/2025, per her note: She recommended removing all irritants or allergens and stop all soaps, detergents, douches, etc Sitz bath in warm water 1 x/day for 10-15 minutes High potency topical corticosteroid ointment (clobetasol 0.05%) rub in 60-90 seconds; reduce to 1-2 x per week or decrease potency when all active LS has resolved, not when asymptomatic, this will be long-term treatment For persistent narrowing of introitus, phimosis, tearing at posterior fourchette=resolve active disease, treat with topical estradiol, PFMPT/dilators; if necessary in office LUNA or surgery    Patient notes since starting clobetasol last week her dysuria has improved but thinks the vulvar area looks the same. Just picked up estradiol cream yesterday.      PHYSICAL EXAMINATION:  No LMP recorded.  There is no height or weight on file to calculate BMI.  There were no vitals taken for this visit.  General Appearance: well appearing  Neuro: Alert and oriented   HEENT: mucous membranes moist, neck supple  Resp: No respiratory distress, normal work of breathing  MSK: normal range of motion, gait appropriate  Pelvic: vulva with erythema left labia has improved 75% from firt imaging in Jan 2025 on chart; no white tissue today; tissue c/w LS bilaterally, atrophy noted;    Imaging and results:  PATHOLOGY REVIEW:  FINAL DIAGNOSIS   A. Vulva, left, biopsy:  - Dense plasma cell infiltrate. See note and comment.  Note:  Differential diagnoses include plasma cell vulvitis (favored) and secondary syphilis.     B. Vulva, right, biopsy:  - Lichenoid tissue reaction. See note and comment.   Note: Differential diagnoses includes early lichen sclerosus and in the right clinical context, lichen planus.     Assessment/Plan     Chelsea Stinson is a 79 y.o.  with vulvar irritation and bilateral vulvar lesions, S/p 1/9/2025 vulvar biopsy confirmed lichen sclerosis.     Clobetasol ointment  Apply daily x 1 mos, then 2-3 x per week  To labia of vulva bilaterally (white and red tissue)    Estrogen vaginal cream  Apply 1 gram 2-3 x per week at urethra inside vaginal opening    Rub all creams/ointments 60-90 seconds with application    Follow up 3 mos vulvar exam  Nurse line [phone]      ATIF Peterson-CNP

## 2025-07-01 NOTE — PATIENT INSTRUCTIONS
Chelsea Stinson is a 79 y.o.  with vulvar irritation and bilateral vulvar lesions, S/p 1/9/2025 vulvar biopsy confirmed lichen sclerosis.     Clobetasol ointment  Apply daily x 1 mos, then 2-3 x per week  To labia of vulva bilaterally (white and red tissue)    Estrogen vaginal cream  Apply 1 gram 2-3 x per week at urethra inside vaginal opening    Rub all creams/ointments 60-90 seconds with application    Follow up 3 mos vulvar exam  Nurse line [phone]

## 2025-10-06 ENCOUNTER — APPOINTMENT (OUTPATIENT)
Dept: UROLOGY | Facility: CLINIC | Age: 80
End: 2025-10-06
Payer: MEDICARE

## (undated) DEVICE — CATHETER, GUIDING, VISTA BRITE 6FR, XB LAD 3.5 100CM

## (undated) DEVICE — GUIDEWIRE, RUN THROUGH WIRE, 180CM

## (undated) DEVICE — CATHETER, GUIDING, VISTA BRITE 6FR, XB 3.0 100CM

## (undated) DEVICE — Device

## (undated) DEVICE — CATHETER, BALLOON, NC EUPHORA NONCOMPLIANT 3.0 X 15 X 142CM

## (undated) DEVICE — MANIFOLD KIT, CUSTOM (SJM)

## (undated) DEVICE — CATHETER, BALLOON DILATION, EUPHORA SEMICOMPLIANT 3.00  X 12 MM X 142CM

## (undated) DEVICE — CATHETER, DIAGNOSTIC, JUDKINS, LEFT, 5 FR-JL 3.5

## (undated) DEVICE — CATHETER, DIAGNOSTIC, JUDKINS, RIGHT, 5 FR-JR 4.0

## (undated) DEVICE — NEEDLE, MERIT ADVANCE, ONE WALL, 21 GA X 4CM, STANDARD SMOOTH

## (undated) DEVICE — INTRODUCER SHEATH, GLIDESHEATH, 6FR 10CM